# Patient Record
Sex: MALE | Race: WHITE | NOT HISPANIC OR LATINO | URBAN - METROPOLITAN AREA
[De-identification: names, ages, dates, MRNs, and addresses within clinical notes are randomized per-mention and may not be internally consistent; named-entity substitution may affect disease eponyms.]

---

## 2024-02-09 ENCOUNTER — PATIENT ROUNDING (BHMG ONLY) (OUTPATIENT)
Dept: ORTHOPEDIC SURGERY | Facility: CLINIC | Age: 59
End: 2024-02-09
Payer: COMMERCIAL

## 2024-02-09 ENCOUNTER — OFFICE VISIT (OUTPATIENT)
Dept: ORTHOPEDIC SURGERY | Facility: CLINIC | Age: 59
End: 2024-02-09
Payer: COMMERCIAL

## 2024-02-09 VITALS
BODY MASS INDEX: 21.31 KG/M2 | HEIGHT: 75 IN | SYSTOLIC BLOOD PRESSURE: 138 MMHG | WEIGHT: 171.4 LBS | DIASTOLIC BLOOD PRESSURE: 93 MMHG | HEART RATE: 77 BPM

## 2024-02-09 DIAGNOSIS — M16.11 PRIMARY OSTEOARTHRITIS OF RIGHT HIP: ICD-10-CM

## 2024-02-09 DIAGNOSIS — M16.12 PRIMARY OSTEOARTHRITIS OF LEFT HIP: ICD-10-CM

## 2024-02-09 DIAGNOSIS — M25.552 LEFT HIP PAIN: Primary | ICD-10-CM

## 2024-02-09 RX ORDER — MELOXICAM 7.5 MG/1
15 TABLET ORAL ONCE
OUTPATIENT
Start: 2024-02-09 | End: 2024-02-09

## 2024-02-09 RX ORDER — PREGABALIN 150 MG/1
150 CAPSULE ORAL ONCE
OUTPATIENT
Start: 2024-02-09 | End: 2024-02-09

## 2024-02-09 RX ORDER — CHLORHEXIDINE GLUCONATE 500 MG/1
CLOTH TOPICAL ONCE
OUTPATIENT
Start: 2024-02-09

## 2024-02-09 RX ORDER — MELOXICAM 15 MG/1
15 TABLET ORAL DAILY
Qty: 30 TABLET | Refills: 2 | Status: SHIPPED | OUTPATIENT
Start: 2024-02-09

## 2024-02-09 RX ORDER — DICLOFENAC SODIUM 75 MG/1
1 TABLET, DELAYED RELEASE ORAL EVERY 12 HOURS SCHEDULED
COMMUNITY
Start: 2024-02-01 | End: 2024-02-09

## 2024-02-09 NOTE — PROGRESS NOTES
A card has been sent to the patient for PATIENT ROUNDING with [facility].

## 2024-02-09 NOTE — PROGRESS NOTES
"Subjective:     Patient ID: Ernst Clark is a 58 y.o. male.    Chief Complaint:    History of Present Illness  Ernst Clark presents to clinic today for evaluation of bilateral right greater than left hip pain.  The patient states that that hips for years needs noted but he is put up with it and continued working.  He states that is the point that he can order across his legs is significantly affecting his quality life and his ability to work and provide for his family.  He has a good deal of pain and stiffness and significantly decreased range of motion.  He states pain is located anteriorly in the groin bilaterally and he can feel grinding and popping when he ambulates.     Social History     Occupational History    Not on file   Tobacco Use    Smoking status: Some Days     Types: Cigarettes     Passive exposure: Current    Smokeless tobacco: Never    Tobacco comments:     quit smoking 30 years ago   Vaping Use    Vaping Use: Never used   Substance and Sexual Activity    Alcohol use: Not Currently    Drug use: Never    Sexual activity: Defer      History reviewed. No pertinent past medical history.  Past Surgical History:   Procedure Laterality Date    KNEE CARTILAGE SURGERY Right     pt states over 20 years ago       History reviewed. No pertinent family history.              Objective:  Vitals:    02/09/24 1002   BP: 138/93   Pulse: 77   Weight: 77.7 kg (171 lb 6.4 oz)   Height: 190.5 cm (75\")         02/09/24  1002   Weight: 77.7 kg (171 lb 6.4 oz)     Body mass index is 21.42 kg/m².        Right Hip Exam     Tenderness   The patient is experiencing tenderness in the anterior.    Range of Motion   Flexion:  90   External rotation:  10   Internal rotation:  0     Muscle Strength   Flexion: 4/5     Tests   JENNI: positive  Fadir:  Positive FADIR test    Other   Erythema: absent  Scars: absent  Sensation: normal  Pulse: present      Left Hip Exam     Tenderness   The patient is experiencing tenderness in " the anterior.    Range of Motion   Flexion:  100   External rotation:  20   Internal rotation: 0     Muscle Strength   Flexion: 4/5     Tests   JENNI: positive  Fadir:  Positive FADIR test    Other   Erythema: absent  Scars: absent  Sensation: normal  Pulse: present               Imaging: 3 views of the bilateral hip and pelvis were ordered and reviewed by myself in the office today  Indication: bilateral hip pain  Findings: X-rays demonstrate no acute osseous abnormality.  There is no signs of fracture dislocation or subluxation.  There is joint space narrowing, subchondral sclerosis, cystic changes, and periarticular osteophytes.  Right hip shows complete loss of joint space with collapse of the femoral head and large cystic changes.  Comparative studies: None      Assessment:        1. Left hip pain    2. Primary osteoarthritis of right hip    3. Primary osteoarthritis of left hip           Plan:        He has failed conservative management of bilateral hip osteoarthritis including activity modification, NSAIDs, rest, and observation.  We discussed further conservative treatments including but not limited to physical therapy, intra-articular injections, nonsteroidal anti-inflammatories, topical creams, use of an assistive device, weight loss, and low impact exercises.  Hevoiced understanding of further nonoperative treatment options but He is interested in proceeding with hip replacement surgery.    The spectrum of treatment options were discussed with the patient in detail including both the nonoperative and operative treatment modalities and their respective risks and benefits.  After thorough discussion, the patient has elected to undergo surgical treatment.  The details of the surgical procedure were explained including the location of probable incisions and a description of the likely implants to be used.  Models and diagrams were used as educational resources. The patient understands the likely  convalescence after surgery, as well as the rehabilitation required.  We thoroughly discussed the risks, benefits, and alternatives to surgery.  The risks include but are not limited to the risk of infection, joint stiffness, blood clots (including DVT and/or pulmonary embolus along with the risk of death), neurologic and/or vascular injury, fracture, dislocation, nonunion, malunion, need for further surgery including hardware failure requiring revision, and continued pain.  It was explained that if tissue has been repaired or reconstructed, there is also a chance of failure which may require further management.  Following the completion of the discussion, the patient expressed understanding of this planned course of care, all their questions were answered and consent will be obtained preoperatively.    Plan for right total hip arthroplasty, anterior approach.    Implants: Depuy Actis Press Fit   Anticoagulation: Asprin  Antibiotics: Cefazolin  Admission Type: Same Day  TXA: Yes          Ernst Clark was in agreement with plan and had all questions answered.     Medications:  New Medications Ordered This Visit   Medications    meloxicam (MOBIC) 15 MG tablet     Sig: Take 1 tablet by mouth Daily.     Dispense:  30 tablet     Refill:  2       Followup:  No follow-ups on file.    Diagnoses and all orders for this visit:    1. Left hip pain (Primary)  -     XR Hip With or Without Pelvis 2 - 3 View Left    2. Primary osteoarthritis of right hip    3. Primary osteoarthritis of left hip    Other orders  -     meloxicam (MOBIC) 15 MG tablet; Take 1 tablet by mouth Daily.  Dispense: 30 tablet; Refill: 2          Dictated utilizing Dragon dictation

## 2024-02-21 ENCOUNTER — TELEPHONE (OUTPATIENT)
Dept: ORTHOPEDIC SURGERY | Facility: CLINIC | Age: 59
End: 2024-02-21
Payer: COMMERCIAL

## 2024-02-21 ENCOUNTER — PREP FOR SURGERY (OUTPATIENT)
Dept: OTHER | Facility: HOSPITAL | Age: 59
End: 2024-02-21
Payer: COMMERCIAL

## 2024-02-21 DIAGNOSIS — M16.11 PRIMARY OSTEOARTHRITIS OF RIGHT HIP: Primary | ICD-10-CM

## 2024-02-21 RX ORDER — PREDNISONE 10 MG/1
TABLET ORAL
Qty: 39 TABLET | Refills: 0 | Status: SHIPPED | OUTPATIENT
Start: 2024-02-21

## 2024-02-21 NOTE — TELEPHONE ENCOUNTER
Patient is asking if he could get rx for prednisone sent in for his hip pain.  This was last filled by another provider on 11/16/23.  He is currently scheduled for total hip on 4/29/24.

## 2024-11-22 ENCOUNTER — OFFICE VISIT (OUTPATIENT)
Dept: ORTHOPEDIC SURGERY | Facility: CLINIC | Age: 59
End: 2024-11-22
Payer: COMMERCIAL

## 2024-11-22 VITALS — BODY MASS INDEX: 22.38 KG/M2 | HEIGHT: 75 IN | WEIGHT: 180 LBS

## 2024-11-22 DIAGNOSIS — M16.11 PRIMARY OSTEOARTHRITIS OF RIGHT HIP: Primary | ICD-10-CM

## 2024-11-22 DIAGNOSIS — M16.12 PRIMARY OSTEOARTHRITIS OF LEFT HIP: ICD-10-CM

## 2024-11-22 RX ORDER — GABAPENTIN 300 MG/1
CAPSULE ORAL
COMMUNITY
Start: 2024-11-01

## 2024-11-22 RX ORDER — AMLODIPINE BESYLATE 10 MG/1
10 TABLET ORAL DAILY
COMMUNITY
Start: 2024-10-22

## 2024-11-22 RX ORDER — ROSUVASTATIN CALCIUM 40 MG/1
1 TABLET, COATED ORAL DAILY
COMMUNITY
Start: 2024-10-15

## 2024-11-22 RX ORDER — OXYCODONE AND ACETAMINOPHEN 5; 325 MG/1; MG/1
TABLET ORAL
COMMUNITY
Start: 2024-11-21

## 2024-11-22 RX ORDER — PREGABALIN 150 MG/1
150 CAPSULE ORAL ONCE
OUTPATIENT
Start: 2024-11-22 | End: 2024-11-22

## 2024-11-22 RX ORDER — CHLORHEXIDINE GLUCONATE 500 MG/1
CLOTH TOPICAL ONCE
OUTPATIENT
Start: 2024-11-22 | End: 2024-11-22

## 2024-11-22 RX ORDER — LOSARTAN POTASSIUM AND HYDROCHLOROTHIAZIDE 12.5; 5 MG/1; MG/1
1 TABLET ORAL DAILY
COMMUNITY
Start: 2024-10-15

## 2024-11-22 NOTE — PROGRESS NOTES
"Subjective:     Patient ID: Ernst Clark is a 59 y.o. male.    Chief Complaint:    History of Present Illness  Ernst Clark returns to clinic today for evaluation of left greater than right hip pain.  The patient states that the hip pain is now so severe that he can barely ambulate and his hips are catching locking clicking and popping.  He states that the pain is excruciating he is not able to sleep and narcotics are no longer controlling the pain.  The patient can barely walk today.  He has audible clicking and grinding when walking down the hallway.     Social History     Occupational History    Not on file   Tobacco Use    Smoking status: Some Days     Types: Cigarettes     Passive exposure: Current    Smokeless tobacco: Never    Tobacco comments:     quit smoking 30 years ago   Vaping Use    Vaping status: Never Used   Substance and Sexual Activity    Alcohol use: Not Currently    Drug use: Never    Sexual activity: Defer      Past Medical History:   Diagnosis Date    Arthritis     Hip pain, bilateral      Past Surgical History:   Procedure Laterality Date    KNEE CARTILAGE SURGERY Right     pt states over 20 years ago       History reviewed. No pertinent family history.              Objective:  Vitals:    11/22/24 1407   Weight: 81.6 kg (180 lb)   Height: 190.5 cm (75\")         11/22/24  1407   Weight: 81.6 kg (180 lb)     Body mass index is 22.5 kg/m².  BMI is within normal parameters. No other follow-up for BMI required.        Right Hip Exam     Tenderness   The patient is experiencing tenderness in the anterior and lateral.    Range of Motion   Flexion:  abnormal   External rotation:  abnormal   Internal rotation:  abnormal     Muscle Strength   Flexion: 3/5     Tests   JENNI: positive  Fadir:  Positive FADIR test    Other   Erythema: absent  Scars: absent  Sensation: normal  Pulse: present      Left Hip Exam     Tenderness   The patient is experiencing tenderness in the anterior and " lateral.    Range of Motion   Flexion:  abnormal   External rotation:  abnormal   Internal rotation: abnormal     Muscle Strength   Flexion: 3/5     Tests   JENNI: positive  Fadir:  Positive FADIR test    Other   Erythema: absent  Scars: absent  Sensation: normal  Pulse: present               Imaging:     Imaging: 3 views of the bilateral hip and pelvis were ordered and reviewed by myself in the office today  Indication: bilateral hip pain  Findings: X-rays demonstrate no acute osseous abnormality.  There is no signs of fracture dislocation or subluxation.  There is loss of joint space narrowing, subchondral sclerosis, cystic changes, and periarticular osteophytes.  There is femoral head flattening and what appears to be JERARDO of the left hip abductor  Comparative studies: None      Assessment:        1. Primary osteoarthritis of right hip    2. Primary osteoarthritis of left hip           Plan:          He has failed conservative management of left hip osteoarthritis.  We discussed further conservative treatments including but not limited to physical therapy, intra-articular injections, nonsteroidal anti-inflammatories, topical creams, use of an assistive device, weight loss, and low impact exercises.  Hevoiced understanding of further nonoperative treatment options but He is interested in proceeding with hip replacement surgery.    The spectrum of treatment options were discussed with the patient in detail including both the nonoperative and operative treatment modalities and their respective risks and benefits.  After thorough discussion, the patient has elected to undergo surgical treatment.  The details of the surgical procedure were explained including the location of probable incisions and a description of the likely implants to be used.  Models and diagrams were used as educational resources. The patient understands the likely convalescence after surgery, as well as the rehabilitation required.  We thoroughly  discussed the risks, benefits, and alternatives to surgery.  The risks include but are not limited to the risk of infection, joint stiffness, blood clots (including DVT and/or pulmonary embolus along with the risk of death), neurologic and/or vascular injury, fracture, dislocation, nonunion, malunion, need for further surgery including hardware failure requiring revision, and continued pain.  It was explained that if tissue has been repaired or reconstructed, there is also a chance of failure which may require further management.  Following the completion of the discussion, the patient expressed understanding of this planned course of care, all their questions were answered and consent will be obtained preoperatively.    Plan for left total hip arthroplasty, anterior approach.    Implants: Perfect Channel and nephew catalyst Press Fit   Anticoagulation: Asprin  Antibiotics: Cefazolin  Admission Type: Same Day  TXA: Yes    CT scan of the pelvis is warranted preoperatively given significant bony deformity and heterotopic ossification of the left hip for preoperative planning this was ordered.  Today.      Ernst Clark was in agreement with plan and had all questions answered.     Medications:  No orders of the defined types were placed in this encounter.      Followup:  No follow-ups on file.    Diagnoses and all orders for this visit:    1. Primary osteoarthritis of right hip (Primary)  -     XR Hips Bilateral With or Without Pelvis 2 View  -     CT Pelvis Without Contrast; Future    2. Primary osteoarthritis of left hip  -     CT Pelvis Without Contrast; Future  -     Case Request; Standing  -     CBC and Differential; Future  -     Basic metabolic panel; Future  -     MRSA Screen Culture (Outpatient) - Swab, Nares; Future  -     Hemoglobin A1c; Future  -     Type and screen; Future  -     lactated ringers bolus 500 mL  -     Tranexamic Acid 1,000 mg in sodium chloride 0.9 % 100 mL  -     Tranexamic Acid 1,000 mg in  sodium chloride 0.9 % 100 mL  -     Chlorhexidine Gluconate Cloth 2 % pads  -     ethyl alcohol 62 % 2 each  -     ceFAZolin (ANCEF) 2 g in sodium chloride 0.9 % 100 mL IVPB  -     pregabalin (LYRICA) capsule 150 mg  -     Case Request    Other orders  -     Follow Anesthesia Guidelines / Protocol; Future  -     Follow Anesthesia Guidelines / Protocol; Standing  -     Verify NPO Status; Standing  -     SCD (sequential compression device)- to be placed on patient in Pre-op; Standing  -     Clip operative site; Standing  -     Type & Screen; Standing          Dictated utilizing Dragon dictation

## 2024-12-06 ENCOUNTER — HOSPITAL ENCOUNTER (OUTPATIENT)
Dept: CT IMAGING | Facility: HOSPITAL | Age: 59
Discharge: HOME | End: 2024-12-06
Payer: COMMERCIAL

## 2024-12-06 DIAGNOSIS — M16.12 PRIMARY OSTEOARTHRITIS OF LEFT HIP: ICD-10-CM

## 2024-12-06 DIAGNOSIS — M16.11 PRIMARY OSTEOARTHRITIS OF RIGHT HIP: ICD-10-CM

## 2024-12-06 PROCEDURE — 72192 CT PELVIS W/O DYE: CPT

## 2024-12-10 ENCOUNTER — TELEPHONE (OUTPATIENT)
Dept: ORTHOPEDIC SURGERY | Facility: CLINIC | Age: 59
End: 2024-12-10
Payer: COMMERCIAL

## 2024-12-10 NOTE — TELEPHONE ENCOUNTER
Called patient to go over results of CT scan.  Nothing alarming noted.  Will plan for total hip arthroplasty left on 1/22/2025

## 2024-12-10 NOTE — TELEPHONE ENCOUNTER
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     Comparison: None available.     Technique: Axial CT images were obtained of the pelvis without contrast administration.  Sagittal and coronal reconstructions were performed.  Automated exposure control and iterative reconstruction methods were used.        Findings:  No displaced fracture or malalignment is observed throughout the osseous pelvis or involving the bilateral hips. The hips are intact. The SI joints and pubic symphysis are intact. The sacral alae and pubic rami are intact. The sacrum and coccyx are   intact. There is no malalignment of the coccyx. The sacral neuroforamina are patent. Age-related changes/degenerative changes are noted. There is severe osteoarthritis of the bilateral hips. These changes may be related to underlying AVN of the hips.     The myotendinous attachments associated with the hips and pelvis bilaterally are intact without avulsion or retraction. There is chronic calcification of the gluteal myotendinous attachments along the left iliac bone suggesting changes of tendinopathy   and/or prior partial injury. There is no edema or abnormal fluid collection in the surrounding soft tissues. There is no hemorrhage or hematoma. No acute abnormalities are noted involving the intraperitoneal soft tissues of the pelvis.     IMPRESSION:  Impression:  1.No evidence for displaced fracture or malalignment throughout the osseous pelvis or involving the bilateral hips.  2.Severe osteoarthritis of the hips bilaterally. These findings may be related changes of prior underlying AVN.  3.No evidence for acute soft tissue abnormality. There is no evidence for musculotendinous avulsion or retraction.           Electronically Signed: Lam Ribeiro MD    12/6/2024 3:48 PM EST    Workstation ID: [id number]

## 2025-01-08 ENCOUNTER — TELEPHONE (OUTPATIENT)
Dept: ORTHOPEDIC SURGERY | Facility: CLINIC | Age: 60
End: 2025-01-08
Payer: COMMERCIAL

## 2025-01-08 NOTE — TELEPHONE ENCOUNTER
Spoke with Karin from [facility]. Informed her that patient has a Pat appointment with [facility] on 1-9-25 for his surgery. I will fax over all labs and EKG after we receive them, for his follow up appointment with their office next week. I did verify their fax number.

## 2025-01-08 NOTE — TELEPHONE ENCOUNTER
Caller: GLORIA FROM [facility]    Relationship: HOSPITAL    Best call back number: [phone]    What orders are you requesting (i.e. lab or imaging):  PAT ORDERS FOR LEFT HIP SURGERY ON 1/22/25    In what timeframe would the patient need to come in:  SAYS PATIENT MADE HIS OWN APPT. TODAY WITH THEM FOR PAT'S. THEY HAVE OFFICE NOTE OF OURS FROM 11/22/24 BUT NO ORDERS. PATIENT WAS THERE BUT LEFT WHEN HOSPITAL COULD NOT REACH THE CLINICAL LINE.    Where will you receive your lab/imaging services:  FAX # [phone]    Additional notes:   HUB NOTICED THAT PATIENT IS SCHEDULED FOR PAT'S ON 1/9/25 AT [facility].

## 2025-01-09 ENCOUNTER — PRE-ADMISSION TESTING (OUTPATIENT)
Dept: PREADMISSION TESTING | Facility: HOSPITAL | Age: 60
End: 2025-01-09
Payer: COMMERCIAL

## 2025-01-09 ENCOUNTER — HOSPITAL ENCOUNTER (OUTPATIENT)
Dept: PHYSICAL THERAPY | Facility: HOSPITAL | Age: 60
Setting detail: THERAPIES SERIES
Discharge: HOME | End: 2025-01-09
Payer: COMMERCIAL

## 2025-01-09 VITALS
OXYGEN SATURATION: 95 % | DIASTOLIC BLOOD PRESSURE: 82 MMHG | BODY MASS INDEX: 21.46 KG/M2 | RESPIRATION RATE: 16 BRPM | SYSTOLIC BLOOD PRESSURE: 114 MMHG | HEIGHT: 75 IN | WEIGHT: 172.6 LBS | HEART RATE: 70 BPM

## 2025-01-09 DIAGNOSIS — M16.11 PRIMARY OSTEOARTHRITIS OF RIGHT HIP: ICD-10-CM

## 2025-01-09 DIAGNOSIS — M16.12 PRIMARY OSTEOARTHRITIS OF LEFT HIP: ICD-10-CM

## 2025-01-09 LAB
ABO GROUP BLD: NORMAL
ABO GROUP BLD: NORMAL
ANION GAP SERPL CALCULATED.3IONS-SCNC: 12.2 MMOL/L (ref 5–15)
BASOPHILS # BLD AUTO: 0.01 10*3/MM3 (ref 0–0.2)
BASOPHILS NFR BLD AUTO: 0.2 % (ref 0–1.5)
BLD GP AB SCN SERPL QL: NEGATIVE
BUN SERPL-MCNC: 15 MG/DL (ref 8–23)
BUN/CREAT SERPL: 16.3 (ref 7–25)
CALCIUM SPEC-SCNC: 9.4 MG/DL (ref 8.6–10.5)
CHLORIDE SERPL-SCNC: 102 MMOL/L (ref 98–107)
CO2 SERPL-SCNC: 24.8 MMOL/L (ref 22–29)
CREAT SERPL-MCNC: 0.92 MG/DL (ref 0.76–1.27)
DEPRECATED RDW RBC AUTO: 44.3 FL (ref 37–54)
EGFRCR SERPLBLD CKD-EPI 2021: 95.2 ML/MIN/1.73
EOSINOPHIL # BLD AUTO: 0.17 10*3/MM3 (ref 0–0.4)
EOSINOPHIL NFR BLD AUTO: 3.3 % (ref 0.3–6.2)
ERYTHROCYTE [DISTWIDTH] IN BLOOD BY AUTOMATED COUNT: 12.5 % (ref 12.3–15.4)
GLUCOSE SERPL-MCNC: 111 MG/DL (ref 65–99)
HBA1C MFR BLD: 6.22 % (ref 4.8–5.6)
HCT VFR BLD AUTO: 44.1 % (ref 37.5–51)
HGB BLD-MCNC: 14.5 G/DL (ref 13–17.7)
IMM GRANULOCYTES # BLD AUTO: 0.01 10*3/MM3 (ref 0–0.05)
IMM GRANULOCYTES NFR BLD AUTO: 0.2 % (ref 0–0.5)
LYMPHOCYTES # BLD AUTO: 1.68 10*3/MM3 (ref 0.7–3.1)
LYMPHOCYTES NFR BLD AUTO: 32.4 % (ref 19.6–45.3)
MCH RBC QN AUTO: 31.1 PG (ref 26.6–33)
MCHC RBC AUTO-ENTMCNC: 32.9 G/DL (ref 31.5–35.7)
MCV RBC AUTO: 94.6 FL (ref 79–97)
MONOCYTES # BLD AUTO: 0.45 10*3/MM3 (ref 0.1–0.9)
MONOCYTES NFR BLD AUTO: 8.7 % (ref 5–12)
NEUTROPHILS NFR BLD AUTO: 2.86 10*3/MM3 (ref 1.7–7)
NEUTROPHILS NFR BLD AUTO: 55.2 % (ref 42.7–76)
PLATELET # BLD AUTO: 178 10*3/MM3 (ref 140–450)
PMV BLD AUTO: 9.9 FL (ref 6–12)
POTASSIUM SERPL-SCNC: 4.1 MMOL/L (ref 3.5–5.2)
QT INTERVAL: 415 MS
QTC INTERVAL: 442 MS
RBC # BLD AUTO: 4.66 10*6/MM3 (ref 4.14–5.8)
RH BLD: POSITIVE
RH BLD: POSITIVE
SODIUM SERPL-SCNC: 139 MMOL/L (ref 136–145)
T&S EXPIRATION DATE: NORMAL
WBC NRBC COR # BLD AUTO: 5.18 10*3/MM3 (ref 3.4–10.8)

## 2025-01-09 PROCEDURE — 86901 BLOOD TYPING SEROLOGIC RH(D): CPT | Performed by: INTERNAL MEDICINE

## 2025-01-09 PROCEDURE — 86900 BLOOD TYPING SEROLOGIC ABO: CPT | Performed by: INTERNAL MEDICINE

## 2025-01-09 PROCEDURE — 86900 BLOOD TYPING SEROLOGIC ABO: CPT

## 2025-01-09 PROCEDURE — 83036 HEMOGLOBIN GLYCOSYLATED A1C: CPT | Performed by: INTERNAL MEDICINE

## 2025-01-09 PROCEDURE — 36415 COLL VENOUS BLD VENIPUNCTURE: CPT

## 2025-01-09 PROCEDURE — 93005 ELECTROCARDIOGRAM TRACING: CPT

## 2025-01-09 PROCEDURE — 93010 ELECTROCARDIOGRAM REPORT: CPT | Performed by: INTERNAL MEDICINE

## 2025-01-09 PROCEDURE — 86850 RBC ANTIBODY SCREEN: CPT | Performed by: INTERNAL MEDICINE

## 2025-01-09 PROCEDURE — 86901 BLOOD TYPING SEROLOGIC RH(D): CPT

## 2025-01-09 PROCEDURE — 80048 BASIC METABOLIC PNL TOTAL CA: CPT | Performed by: INTERNAL MEDICINE

## 2025-01-09 PROCEDURE — 85025 COMPLETE CBC W/AUTO DIFF WBC: CPT | Performed by: INTERNAL MEDICINE

## 2025-01-09 PROCEDURE — 87081 CULTURE SCREEN ONLY: CPT | Performed by: INTERNAL MEDICINE

## 2025-01-09 RX ORDER — LOSARTAN POTASSIUM AND HYDROCHLOROTHIAZIDE 25; 100 MG/1; MG/1
1 TABLET ORAL DAILY
COMMUNITY

## 2025-01-09 NOTE — DISCHARGE INSTRUCTIONS
PRE-ADMISSION TESTING INSTRUCTIONS FOR TOTAL JOINT PATIENTS    Take these medications the morning of surgery with a small sip of water:  amlodipine and pain pill if needed      No aspirin, advil, aleve, ibuprofen, naproxen, diet pills, decongestants, or vitamin/herbal supplements for a week prior to your surgery.     Tylenol/Acetaminophen ok to take if needed.    Do not take any insulin or diabetes medications the morning of surgery.    Your surgeon may give you Bactroban to use prior to surgery. This will be started 5 days prior to surgery, follow the directions on your prescription from your surgeon for use.      General Instructions:    DO NOT EAT SOLID FOOD AFTER MIDNIGHT THE NIGHT BEFORE SURGERY. No gum, mints, or hard candy after midnight the night before surgery.  You may drink clear liquids the day of surgery up until 2 hours before your arrival time.  Clear liquids are liquids you can see through. Nothing RED in color.    Plain water    Sports drinks      Gelatin (Jell-O)  Fruit juices without pulp such as white grape juice and apple juice  Popsicles that contain no fruit or yogurt  Tea or coffee (no cream or milk added)    It is beneficial for you to have a clear drink that contains carbohydrates 2 hours prior to your arrival time.  DRINK A BOTTLE OF SPORTS DRINK 2 HOURS BEFORE YOUR ARRIVAL TIME. IF YOU ARE DIABETIC, DRINK A LOW OR NO SUGAR SPORTS DRINK. ANY COLOR EXCEPT RED.    Patients who avoid smoking, chewing tobacco and alcohol for 4 weeks prior to surgery have a reduced risk of post-operative complications.  If at all possible, quit smoking as many days before surgery as you can.    Do not smoke, use chewing tobacco or drink alcohol after midnight the day of surgery.    Bring your C-PAP/ BI-PAP machine if you use one.  Wear clean comfortable clothes.  Do not wear contact lenses, lotion, deodorant, or make-up.  Bring a case for your glasses if applicable.   You may brush your teeth the morning of  surgery.  You may wear dentures/partials, do not put adhesive/glue on them.  Leave all other jewelry and valuables at home.  NOTIFY YOUR SURGEON IF YOU BECOME ILL, HAVE A FEVER, PRODUCTIVE COUGH, OR CANNOT BE HERE THE DAY OF SURGERY.      Preventing a Surgical Site Infection:    Shower the night before and on the morning of surgery using the chlorhexidine soap you were given.  Use a clean washcloth with the soap.  Place clean sheets on your bed after showering the night before surgery. Do not use the CHG soap on your hair, face, or private areas. Wash your body gently for five (5) minutes. Do not scrub your skin.  Dry with a clean towel and dress in clean clothing.    Do not shave the surgical area for 10 days-2 weeks prior to surgery  because the razor can irritate skin and make it easier to develop an infection.  Make sure you, your family, and all healthcare providers clean their hands with soap and water or an alcohol based hand  before caring for you or your wound.      Day of surgery:    Your surgeon’s office will advise you of your arrival time for the day of surgery.    Upon arrival, a Pre-op nurse and Anesthesia provider will review your health history, obtain vital signs, and answer questions you may have. The anesthesia provider will also discuss the type of anesthesia that will be needed for your procedure, which may include general anesthesia. The only belongings needed at this time will be your home medications and if applicable your C-PAP/BI-PAP machine.  If you are staying overnight your family can leave the rest of your belongings in the car and bring them to your room later.  A Pre-op nurse will start an IV and you may receive medication in preparation for surgery, including something to help you relax.  Your family will be able to see you in the Pre-op area.  While you are in surgery your family should notify the waiting room  if they leave the waiting room area and provide a  contact phone number.    If you have any questions, you can call the Pre-Admission Department at [phone] or your surgeon's office.    Please be aware that surgery does come with discomfort.  We want to make every effort to control your discomfort so please discuss any uncontrolled symptoms with your nurse.   Your doctor will most likely have prescribed pain medications.     You may have bruising or discomfort from the tourniquet used in surgery.     Please leave all luggage in the car the morning of surgery.  You will be transported to your hospital room following the recovery period.  Your family can get your luggage at that time.      You may receive a survey regarding the care you received. Your feedback is very important and will be used to collect the necessary data to help us to continue to provide excellent care.

## 2025-01-09 NOTE — PAT
Pt here for PAT visit.  Pre-op tests completed, chg soap given, and instructions reviewed.  Instructed clears until 2 hrs prior to arrival time, voiced understanding. HARIKA dressing handout given and reviewed, voiced understanding. Pt unsure of  day of surgery and who is staying w/him after discharge; instructed had to have someone over age 18 with him; voiced understanding.

## 2025-01-10 ENCOUNTER — ANESTHESIA EVENT (OUTPATIENT)
Dept: PERIOP | Facility: HOSPITAL | Age: 60
End: 2025-01-10
Payer: COMMERCIAL

## 2025-01-11 LAB — MRSA SPEC QL CULT: ABNORMAL

## 2025-01-21 NOTE — OP NOTE
OPERATIVE REPORT    Date of Surgery:   01/22/25    Attending Surgeon:   Gilberto Rowell MD, MSE    ASSISTANTS:    Assistant: (Noah Mariano CSA) was responsible for performing the following activities: Retraction, Irrigation, Suturing, Closing, and Placing Dressing and their skilled assistance was necessary for the success of this case.     ANESTHESIA:   Spinal    PREOPERATIVE DIAGNOSIS:   Left hip osteoarthritis    POSTOPERATIVE DIAGNOSIS:   Same as above     PROCEDURE:   1. Left primary total hip arthroplasty    SURGICAL APPROACH:        IMPLANTS:   1.Covarrubias and Nephew R3 Acetabular Shell, 60 mm OD  2. Smith and Nephew 6.5 mm acetabular screws, 2 30 and 40 mm  3. Covarrubias and Nephew Dual Mobility  46  mm ID, neutral  4. Smith and Nephew Polar standard offset, size 4  5. Femoral Head, Size 28, Neck length 4  6. Dual mobility outer ball 46 mm OD, 28 mm ID    CLOSURE:  #5 Ethibond  #1 PDS STRATAFIX  2-0 Monocryl STRATAFIX  3-0 Monocryl STRATAFIX  Exofin glue    INDICATIONS:   This patient has a history of Left end stage hip osteoarthritis. The patient has failed conservative treatment. He is therefore indicated for total hip arthroplasty.  Risks of surgery, including pain, bleeding, infection, scar, injury to nearby neurovascular structures, fracture, venous thromboembolism, limb length discrepancy, dislocation, need for more procedures, implant failure, implant loosening, and implant subsidence were discussed preoperatively.  The risks of anesthesia including heart attack, stroke, blood clots and even death were discussed. The benefits of surgery including pain relief and restoration of function were discussed. Alternatives to surgery, including nonoperative care were also discussed and the patient elects to proceed with CHERYL.    OPERATIVE FINDINGS:  Left full thickness cartilage loss on the femoral head and acetabulum and osteophytes    DESCRIPTION OF PROCEDURE:   The patient was met in the preoperative holding area,  evaluated, consent was obtained, and the Left hip was marked.  The patient was brought to the operating room and placed on the operating room table in the supine position.  After anesthesia was established, the patient was prepped and draped in the usual sterile fashion. All bony prominences and peripheral nerves were padded.  A surgical time out was taken to confirm the operative side and details of the operative procedure.  The patient was given prophylactic antibiotics prior to skin incision.      We utilized an anterior oblique incision and the Covarrubias-Leslie interval to access the hip. An oblique anterior incision was made starting 2 centimeters lateral and distal to the anterior-superior iliac spine and extending distal and slight lateral in line with the tensor fascia adrian.  The position of the incision was confirmed with fluoroscopy prior to incision. This incision was carried through subcutaneous tissue to the underlying fascia of the tensor fascia adrian muscle.  The fascia was incised.  The tensor fascia muscle belly was reflected superolaterally with a curved cobra retractor over the femoral neck.  The rectus was reflected medially with a curved Cobra retractor, inferior to the femoral neck.  A third retractor was carefully placed under the rectus onto the anterior hip capsule directly on bone.    The anterior hip capsule was exposed.  The reflected head of the rectus tendon was exposed.  The capsulotomy was performed along the axis of the superior femoral neck.  A superior flap was raised off the greater trochanter and off the acetabular rim and reflected superiorly underneath our curved Cobra retractor.  An inferior flap was created by peeling the capsule off the intertrochanteric ridge inferomedially to the top of the lesser trochanter, and again reflected anteriorly with our curved Cobra retractor.  The capsular flaps were tagged. The femoral neck cut was then made.  This was based off our preoperative  plan using anatomic landmarks and confirmed with fluoroscopic guidance.  The femoral neck cut was made in two places.  The central segment of the neck was removed separately, and then the femoral head was removed.      The acetabulum was exposed.  A bent Hohmann retractor was placed anterior-inferior between the capsule and the labrum.  A Cobra retractor was placed posteriorly between the capsule and the labrum. The inferior capsule was incised. The acetabular labrum was excised.  Overhanging soft tissue was excised to provide excellent exposure of the acetabulum. The acetabulum was then sequentially reamed under direct visualization and fluoroscopic guidance with increased size reamers until our final reamer, which gave a 1 millimeter press-fit.  The acetabulum was then placed and impacted into position.  The acetabular component placement goal was approximately 42.5 degrees of abduction, with 15 degrees of anteversion.  We used our preoperative planning, bony landmarks and fluoroscopy to assess position.  The acetabular component was impacted into the pelvis and then secured to the pelvis with 2 screws.  The acetabular liner was placed, impacted into position; it locked.  The wound was then irrigated.     We then turned to the proximal femur.  The retractors were removed, and a new set of retractors was placed.  The OmniTract femoral elevator hook was placed around the vastus lateralis and proximal femur between the vastus tubercle and gluteus jessenia tendon. The hip was hyperextended by putting the bed in Trendelenberg and lowering the foot of the bed. The hook was assembled to its mounting frame and a gentle amount of tension was placed on the hook.    The double pronged retractor was placed on the posteromedial proximal femur and the double toothed retractor was placed in the interval between the capsule and gluteus minimus. Femoral releases of the capsule, piriformis and conjoint tendon were sequentially  performed as needed until adequate exposure was obtained.  The femur was then extended, adducted, and externally rotated.  The proximal femur was mobilized into the wound to create adequate exposure of the proximal femur.  After exposure was obtained, the femur was prepared with a box osteotome, curved awl, and then broached up to the final broach size. The hook was de-tensioned and the hook and retractors were then removed.    AP and lateral fluoroscopy of the femur were obtained to confirm good fit and alignment of the stem, as well as no fractures or cortical perforations were present. The trial neck and head were then placed, and the hip was reduced. Fluoroscopy was used to confirm length and offset were correct. Leg lengths were assessed clinically on the table.  The hip was assessed in extension, external rotation to check anterior stability.  It was stable.  Posterior stability was assessed with flexion, adduction, internal rotation. The hip was stable.  We flexed to 90 with internal rotation, stable.  Full flexion stable.  The hip was then dislocated.  The femur was exposed with the hook and retractors. The trial implants were removed.  The final hip implants were placed, impacted into position.  The stem had a good fit and fixation.  The femoral head was placed.  The hip was reduced.  With the final implants in position, we trialed the hip again.  Fluoroscopic images were again obtained, including AP and lateral views, which demonstrated excellent component position and no intraoperative fractures. The leg lengths, anterior stability, and posterior stability were all excellent.  The hip was then copiously irrigated with dilute Betadine and normal saline using a pulsatile lavage.  The hip capsular flaps were closed with a #5 Ethibond. The tensor fascia was closed with #1 PDS STRATAFIX.  The hip capsular flaps as well as fascia and subcutaneous tissue were injected with a mixture consisting of 30 mL of 0.5%  ropivacaine, 30 mg of ketorolac, and epinephrine 0.2 mg solution diluted in 30 mL of normal saline.  Subcutaneous tissue closed with 2-0 Monocryl STRATAFIX.  The skin was closed with 3-0 Monocryl, STRATAFIX, and exofin glue and a sterile HARIKA dressing was placed.    The patient tolerated the procedure well and was taken to the recovery room in stable condition.  Following completion of the surgery all sponge instrument needle counts were correc    POSTOPERATIVE PLAN:   1. Weightbearing as tolerated on operative extremity  2. Anterior hip precautions x 4 weeks  3. DVT prophylaxis    ESTIMATED BLOOD LOSS:   200 ccs    INTRAOPERATIVE FLUIDS:   Per anesthesia record    SPONGE/INSTRUMENT/NEEDLE COUNTS:   Correct x 2    CONDITION ON DISCHARGE:   Stable    COMPLICATIONS:   none    Gilberto Roewll MD, MSE

## 2025-01-21 NOTE — DISCHARGE INSTR - APPOINTMENTS
You have a physical therapy appointment at [facility] OP PT on 1/24/2025 @ 10:30am. If you have any questions regarding this appointment you can reach them at [phone].

## 2025-01-21 NOTE — H&P
[facility]   HISTORY AND PHYSICAL    Patient Name:Ernst Clark  : 1965  MRN: [id number]  Primary Care Physician: Дмитрий Villasenor MD  Date of admission: 2025    Subjective   Subjective     Chief Complaint: left hip pain/OA    History of Present Illness   Ernst Clark is a 59 y.o. male who presents to the hospital today for left hip pain and osteoarthritis.  Patient states the hip pain is located anteriorly in the groin and is worse with activity and better with rest.  The patient has exhausted conservative treatments.  The patient states that their quality of life is suffered significantly.  The patient was seen by me in the office at an earlier date and was scheduled for left total hip arthroplasty.  The patient presents to the hospital today for that procedure.       Review of Systems   Musculoskeletal:  Positive for arthralgias, gait problem, joint swelling and myalgias.   All other systems reviewed and are negative.        Personal History     Past Medical History:   Diagnosis Date    Arthritis     Elevated cholesterol     Hip pain, bilateral     Hypertension     Left shoulder pain        Past Surgical History:   Procedure Laterality Date    KNEE CARTILAGE SURGERY Right     pt states over 20 years ago       Family History: His family history is not on file.     Social History: He  reports that he has been smoking cigarettes. He has been exposed to tobacco smoke. His smokeless tobacco use includes snuff. He reports that he does not currently use alcohol. He reports that he does not use drugs.    Home Medications:  amLODIPine, aspirin, gabapentin, losartan-hydrochlorothiazide, meloxicam, ondansetron, oxyCODONE-acetaminophen, rosuvastatin, sennosides-docusate, and sulfamethoxazole-trimethoprim    Allergies:  He has No Known Allergies.    Objective    Objective     Vitals:    Temp:  [98.3 °F (36.8 °C)] 98.3 °F (36.8 °C)  Heart Rate:  [88] 88  Resp:  [16] 16  BP: (174)/(118)  174/118    Physical Exam   Left Hip Exam      Tenderness   The patient is experiencing tenderness in the anterior and lateral.     Range of Motion   Flexion:  abnormal   External rotation:  abnormal   Internal rotation: abnormal      Muscle Strength   Flexion: 3/5      Tests   JENNI: positive  Fadir:  Positive FADIR test     Other   Erythema: absent  Scars: absent  Sensation: normal  Pulse: present       Result Review    Result Review:  I have personally reviewed the results from the time of this admission to 1/22/2025 08:24 EST and agree with these findings:  []  Laboratory list / accordion  []  Microbiology  []  Radiology  []  EKG/Telemetry   []  Cardiology/Vascular   []  Pathology  []  Old records  []  Other:  Most notable findings include:   Imaging: 3 views of the bilateral hip and pelvis   Indication: bilateral hip pain  Findings: X-rays demonstrate no acute osseous abnormality.  There is no signs of fracture dislocation or subluxation.  There is loss of joint space narrowing, subchondral sclerosis, cystic changes, and periarticular osteophytes.  There is femoral head flattening and what appears to be JERARDO of the left hip abductor  Comparative studies: None      Assessment & Plan   Assessment / Plan     Brief Patient Summary:  Ernst Clark is a 59 y.o. male with left hip OA    Active Hospital Problems:  Active Hospital Problems    Diagnosis     **Primary osteoarthritis of left hip      Plan:   Cc: f/u left hip pain, DJD    Patient presented to the hopsital today for scheduled left total hip arthroplasty.    I reviewed anatomy and a model of a total hip arthroplasty, as well as typical postoperative recovery of 6-12 months before maximal recovery, and possible need for rehabilitation stay after hospitalization. We also discussed risks, benefits, and alternatives of procedure with risks including but not limited to neurovascular damage, bleeding, infection, malalignment, chronic pain, leg length discrepancy,  failure of implants, osteolysis, loosening of implants, loss of motion, weakness, stiffness, instability, DVT, pulmonary embolus, death, stroke, complex regional pain syndrome, myocardial infarction, and need for additional procedures. Patient understood all these and had all questions answered before consenting to the procedure. No guarantees were given in regards to results from the surgery.  Patient has been medically optimize by his primary care physician.    Implants: Covarrubias and nephew Polar Press Fit   Anticoagulation: Asprin  Antibiotics: Cefazolin and Vanc  Post op ABX: Yes  TXA: Yes  Admission Type: Same Day    All remaining questions answered today.     VTE Prophylaxis:  Mechanical VTE prophylaxis orders are signed & held. Mechanical VTE prophylaxis orders are present.        Gilberto Rowell MD

## 2025-01-21 NOTE — DISCHARGE INSTRUCTIONS
Total Hip Replacement Discharge Instructions:    I. ACTIVITIES:  1. Exercises:  Complete exercise program as taught by the hospital physical therapist 2 times per day  Exercise program will be advanced by the physical therapist  During the day be up ambulating every 2 hours (while awake) for short distances  Complete the ankle pump exercises at least 10 times per hour (while awake)  Elevate legs when in bed and for at least 30 minutes during the day.Use cold packs 20-30 minutes approximately 5 times per day. This should be done before and after completing your exercises and at any time you are experiencing pain/ stiffness in your operative extremity.      2. Activities of Daily Living:  No tub baths, hot tubs, or swimming pools for 4 weeks  May shower and let water run over the incision on post-operative day #5 if no drainage. Do not scrub or rub the incision. Simply let the water run over the incision and pat dry.    II. Restrictions  Continue hip precautions as taught at the hospital  Your surgeon will discuss with you when you will be able to drive again.  Weight bearing is as tolerated  First week stay inside on even terrain. May go up and down stairs one stair at a time utilizing the hand rail once cleared by physical therapy to do so.  After one week, you may venture outside (if cleared to do so by physical therapist).    III. Precautions:  Everyone that comes near you should wash their hands  No elective dental, genital-urinary, or colon procedures or surgical procedures for 12 weeks after surgery unless absolutely necessary.   If dental work or surgical procedure is deemed absolutely necessary, you will need to contact your surgeon as you will need to take antibiotics 1 hour prior to any dental work (including teeth cleanings).  Please discuss with your surgeon prophylactic antibiotics as the length of time this intervention will be necessary for you varies with each patient’s health history and  situation.  Avoid sick people. If you must be around someone who is ill, they should wear a mask.  Avoid visits to the Emergency Room or Urgent Care unless you are having a life threatening event.   If ordered stockings are to be placed on in the morning and removed at night. Monitor the stockings to ensure that any swelling is not causing the stockings to become too tight. In this case, remove stockings immediately.    IV. INCISION CARE:  The dressing placed during surgery is waterproof and should remain in place for 5 to 7 days.  May shower with waterproof dressing on and allowed water to run over the dressing  Remove the dressing around day 5-7  There is Steri-Strips underneath your dressing which may stay on and will eventually fall off on their own  After dressing removal on day 7 May shower and allow water to run over the incision  No soaking or submersion of the incision in tubs pools hot tubs etc.  No creams or ointments to the incision  Do not touch or pick at the incision  Check incision every day and notify surgeon immediately if any of the following signs or symptoms are noted:  Increase in redness  Increase in swelling around the incision and of the entire extremity  Increase in pain  Drainage oozing from the incision  Pulling apart of the edges of the incision  Increase in overall body temperature (greater than 100.5 degrees)  Your surgeon will instruct you regarding suture or staple removal    V. Medications:   1. Anticoagulants: You will be discharged on an anticoagulant. This is a prophylactic medication that helps prevent blood clots during your post-operative period. The type and length of dosage varies based on your individual needs, procedure performed, and surgeon’s preference.  While taking the anticoagulant, you should avoid taking any additional aspirin, ibuprofen (Advil or Motrin), Aleve (Naprosyn) or other non-steroidal anti-inflammatory medications.   Notify surgeon immediately if any  carlo bleeding is noted in the urine, stool, emesis, or from the nose or the incision. Blood in the stool will often appear as black rather than red. Blood in urine may appear as pink. Blood in emesis may appear as brown/black like coffee grounds.  You will need to apply pressure for longer periods of time to any cuts or abrasions to stop bleeding  Avoid alcohol while taking anticoagulants    2. Stool Softeners: You will be at greater risk of constipation after surgery due to being less mobile and the pain medications.   Take stool softeners as instructed by your surgeon while on pain medications. Over the counter Colace 100 mg 1-2 capsules twice daily.   If stools become too loose or too frequent, please decreases the dosage or stop the stool softener.  If constipation occurs despite use of stool softeners, you are to continue the stool softeners and add a laxative (Milk of Magnesia 1 ounce daily as needed)  Drink plenty of fluids, and eat fruits and vegetables during your recovery time    3. Pain Medications utilized after surgery are narcotics and the law requires that the following information be given to all patients that are prescribed narcotics:  CLASSIFICATION: Pain medications are called Opioids and are narcotics  LEGALITIES: It is illegal to share narcotics with others and to drive within 24 hours of taking narcotics  POTENTIAL SIDE EFFECTS: Potential side effects of opioids include: nausea, vomiting, itching, dizziness, drowsiness, dry mouth, constipation, and difficulty urinating.  POTENTIAL ADVERSE EFFECTS:   Opioid tolerance can develop with use of pain medications and this simply means that it requires more and more of the medication to control pain; however, this is seen more in patients that use opioids for longer periods of time.  Opioid dependence can develop with use of Opioids and this simply means that to stop the medication can cause withdrawal symptoms; however, this is seen with patients  that use Opioids for longer periods of time.  Opioid addiction can develop with use of Opioids and the incidence of this is very unlikely in patients who take the medications as ordered and stop the medications as instructed.  Opioid overdose can be dangerous, but is unlikely when the medication is taken as ordered and stopped when ordered. It is important not to mix opioids with alcohol or with and type of sedative such as Benadryl as this can lead to over sedation and respiratory difficulty.  DOSAGE:   Pain medications will need to be taken consistently for the first week to decrease pain and promote adequate pain relief and participation in physical therapy.  After the initial surgical pain begins to resolve, you may begin to decrease the pain medication. By the end of 6 weeks, you should be off of pain medications.  Refills will not be given by the office during evening hours, on weekends, or after 6 weeks post-op.  To seek refills on pain medications during the initial 6 week post-operative period, you must call the office 48 hours in advance to request the refill. The office will then notify you when to  the prescription. DO NOT wait until you are out of the medication to request a refill.    V. FOLLOW-UP VISITS:  You will need to follow up in the office with your surgeon in  2 weeks. Please call this number [phone]  to schedule this appointment.  If you have any concerns or suspected complications prior to your follow up visit, please call your surgeons office. Do not wait until your appointment time if you suspect complications. These will need to be addressed in the office promptly.

## 2025-01-21 NOTE — CASE MANAGEMENT/SOCIAL WORK
Continued Stay Note  LADAN Stacy     Patient Name: Ernst Clark  MRN: [id number]  Today's Date: 1/21/2025    Admit Date: (Not on file)        Discharge Plan       Row Name 01/21/25 1518       Plan    Plan Comments Patient states he will  need a rolling walker and BSC at discharge and referral was placed in [facility] for [facility]. Patient states he would like to do OP PT at [facility] and is agreeable for CM to schedule this appointment. CM spoke with Raine at [facility] OP PT and she has scheduled an appointment for 1/24/25 @ 10:30am. CM will follow.                   Discharge Codes    No documentation.                       Mya Del Cid RN

## 2025-01-22 ENCOUNTER — ANESTHESIA (OUTPATIENT)
Dept: PERIOP | Facility: HOSPITAL | Age: 60
End: 2025-01-22
Payer: COMMERCIAL

## 2025-01-22 ENCOUNTER — APPOINTMENT (OUTPATIENT)
Dept: GENERAL RADIOLOGY | Facility: HOSPITAL | Age: 60
End: 2025-01-22
Payer: COMMERCIAL

## 2025-01-22 ENCOUNTER — HOSPITAL ENCOUNTER (OUTPATIENT)
Facility: HOSPITAL | Age: 60
Setting detail: HOSPITAL OUTPATIENT SURGERY
Discharge: HOME | End: 2025-01-22
Attending: INTERNAL MEDICINE | Admitting: INTERNAL MEDICINE
Payer: COMMERCIAL

## 2025-01-22 VITALS
TEMPERATURE: 97.7 F | HEART RATE: 120 BPM | OXYGEN SATURATION: 99 % | WEIGHT: 162.2 LBS | SYSTOLIC BLOOD PRESSURE: 154 MMHG | BODY MASS INDEX: 20.27 KG/M2 | RESPIRATION RATE: 18 BRPM | DIASTOLIC BLOOD PRESSURE: 110 MMHG

## 2025-01-22 DIAGNOSIS — M16.12 PRIMARY OSTEOARTHRITIS OF LEFT HIP: ICD-10-CM

## 2025-01-22 DIAGNOSIS — Z96.642 S/P HIP REPLACEMENT, LEFT: Primary | ICD-10-CM

## 2025-01-22 PROCEDURE — C1713 ANCHOR/SCREW BN/BN,TIS/BN: HCPCS | Performed by: INTERNAL MEDICINE

## 2025-01-22 PROCEDURE — 25810000003 SODIUM CHLORIDE 0.9 % SOLUTION 250 ML FLEX CONT: Performed by: INTERNAL MEDICINE

## 2025-01-22 PROCEDURE — 25010000002 MIDAZOLAM PER 1MG: Performed by: NURSE ANESTHETIST, CERTIFIED REGISTERED

## 2025-01-22 PROCEDURE — 25010000002 HYDROMORPHONE 1 MG/ML SOLUTION: Performed by: NURSE ANESTHETIST, CERTIFIED REGISTERED

## 2025-01-22 PROCEDURE — 72170 X-RAY EXAM OF PELVIS: CPT

## 2025-01-22 PROCEDURE — 25010000002 ONDANSETRON PER 1 MG: Performed by: NURSE ANESTHETIST, CERTIFIED REGISTERED

## 2025-01-22 PROCEDURE — C1776 JOINT DEVICE (IMPLANTABLE): HCPCS | Performed by: INTERNAL MEDICINE

## 2025-01-22 PROCEDURE — 97161 PT EVAL LOW COMPLEX 20 MIN: CPT

## 2025-01-22 PROCEDURE — 73502 X-RAY EXAM HIP UNI 2-3 VIEWS: CPT

## 2025-01-22 PROCEDURE — 25010000002 DEXAMETHASONE PER 1 MG: Performed by: INTERNAL MEDICINE

## 2025-01-22 PROCEDURE — 25810000003 LACTATED RINGERS PER 1000 ML: Performed by: NURSE ANESTHETIST, CERTIFIED REGISTERED

## 2025-01-22 PROCEDURE — 25010000002 KETOROLAC TROMETHAMINE PER 15 MG: Performed by: INTERNAL MEDICINE

## 2025-01-22 PROCEDURE — 25010000002 EPINEPHRINE 1 MG/ML SOLUTION 1 ML VIAL: Performed by: INTERNAL MEDICINE

## 2025-01-22 PROCEDURE — 25010000002 PHENYLEPHRINE 10 MG/ML SOLUTION: Performed by: NURSE ANESTHETIST, CERTIFIED REGISTERED

## 2025-01-22 PROCEDURE — 25010000002 MEPIVACAINE PF 2 % SOLUTION 20 ML VIAL: Performed by: NURSE ANESTHETIST, CERTIFIED REGISTERED

## 2025-01-22 PROCEDURE — 25010000002 PROPOFOL 200 MG/20ML EMULSION: Performed by: NURSE ANESTHETIST, CERTIFIED REGISTERED

## 2025-01-22 PROCEDURE — 25010000002 LIDOCAINE 2% SOLUTION: Performed by: NURSE ANESTHETIST, CERTIFIED REGISTERED

## 2025-01-22 PROCEDURE — 25010000002 CEFAZOLIN PER 500 MG: Performed by: INTERNAL MEDICINE

## 2025-01-22 PROCEDURE — 25010000002 VANCOMYCIN HCL 1.25 G RECONSTITUTED SOLUTION 1 EACH VIAL: Performed by: INTERNAL MEDICINE

## 2025-01-22 PROCEDURE — 76000 FLUOROSCOPY <1 HR PHYS/QHP: CPT

## 2025-01-22 PROCEDURE — 25010000002 ROPIVACAINE PER 1 MG: Performed by: INTERNAL MEDICINE

## 2025-01-22 DEVICE — REFLECTION SPHERICAL HEAD SCREW 30MM
Type: IMPLANTABLE DEVICE | Site: HIP | Status: FUNCTIONAL
Brand: REFLECTION

## 2025-01-22 DEVICE — VIOLET ANTIBACTERIAL POLYDIOXANONE, KNOTLESS TISSUE CONTROL DEVICE
Type: IMPLANTABLE DEVICE | Site: HIP | Status: FUNCTIONAL
Brand: STRATAFIX

## 2025-01-22 DEVICE — POLARSTEM COLLAR STANDARD                                    NON-CEMENTED WITH TI/HA 4
Type: IMPLANTABLE DEVICE | Site: HIP | Status: FUNCTIONAL
Brand: POLARSTEM

## 2025-01-22 DEVICE — IMPLANTABLE DEVICE: Type: IMPLANTABLE DEVICE | Site: HIP | Status: FUNCTIONAL

## 2025-01-22 DEVICE — OR3O DUAL MOBILITY XLPE INSERT 28/46
Type: IMPLANTABLE DEVICE | Site: HIP | Status: FUNCTIONAL
Brand: OR3O DUAL MOBILITY

## 2025-01-22 DEVICE — OXINIUM FEMORAL HEAD 12/14 TAPER                                    28 MM +4
Type: IMPLANTABLE DEVICE | Site: HIP | Status: FUNCTIONAL
Brand: OXINIUM

## 2025-01-22 DEVICE — R3 3 HOLE ACETABULAR SHELL 60MM
Type: IMPLANTABLE DEVICE | Site: HIP | Status: FUNCTIONAL
Brand: R3 ACETABULAR

## 2025-01-22 DEVICE — OR3O DUAL MOBILITY LINER 46/60
Type: IMPLANTABLE DEVICE | Site: HIP | Status: FUNCTIONAL
Brand: OR3O DUAL MOBILITY

## 2025-01-22 DEVICE — REFLECTION SPHERICAL HEAD SCREW 40MM
Type: IMPLANTABLE DEVICE | Site: HIP | Status: FUNCTIONAL
Brand: REFLECTION

## 2025-01-22 DEVICE — KNOTLESS TISSUE CONTROL DEVICE, UNDYED UNIDIRECTIONAL (ANTIBACTERIAL) SYNTHETIC ABSORBABLE DEVICE
Type: IMPLANTABLE DEVICE | Site: HIP | Status: FUNCTIONAL
Brand: STRATAFIX

## 2025-01-22 RX ORDER — LIDOCAINE HYDROCHLORIDE 10 MG/ML
0.5 INJECTION, SOLUTION EPIDURAL; INFILTRATION; INTRACAUDAL; PERINEURAL ONCE AS NEEDED
Status: DISCONTINUED | OUTPATIENT
Start: 2025-01-22 | End: 2025-01-22 | Stop reason: HOSPADM

## 2025-01-22 RX ORDER — MIDAZOLAM HYDROCHLORIDE 2 MG/2ML
INJECTION, SOLUTION INTRAMUSCULAR; INTRAVENOUS AS NEEDED
Status: DISCONTINUED | OUTPATIENT
Start: 2025-01-22 | End: 2025-01-22 | Stop reason: SURG

## 2025-01-22 RX ORDER — CHLORHEXIDINE GLUCONATE 500 MG/1
CLOTH TOPICAL ONCE
Status: DISCONTINUED | OUTPATIENT
Start: 2025-01-22 | End: 2025-01-22 | Stop reason: HOSPADM

## 2025-01-22 RX ORDER — PHENYLEPHRINE HYDROCHLORIDE 10 MG/ML
INJECTION INTRAVENOUS AS NEEDED
Status: DISCONTINUED | OUTPATIENT
Start: 2025-01-22 | End: 2025-01-22 | Stop reason: SURG

## 2025-01-22 RX ORDER — LIDOCAINE HYDROCHLORIDE 20 MG/ML
INJECTION, SOLUTION INFILTRATION; PERINEURAL AS NEEDED
Status: DISCONTINUED | OUTPATIENT
Start: 2025-01-22 | End: 2025-01-22 | Stop reason: SURG

## 2025-01-22 RX ORDER — ONDANSETRON 4 MG/1
4 TABLET, FILM COATED ORAL EVERY 8 HOURS PRN
Qty: 15 TABLET | Refills: 0 | Status: SHIPPED | OUTPATIENT
Start: 2025-01-22

## 2025-01-22 RX ORDER — SODIUM CHLORIDE 0.9 % (FLUSH) 0.9 %
10 SYRINGE (ML) INJECTION EVERY 12 HOURS SCHEDULED
Status: DISCONTINUED | OUTPATIENT
Start: 2025-01-22 | End: 2025-01-22 | Stop reason: HOSPADM

## 2025-01-22 RX ORDER — SODIUM CHLORIDE, SODIUM LACTATE, POTASSIUM CHLORIDE, CALCIUM CHLORIDE 600; 310; 30; 20 MG/100ML; MG/100ML; MG/100ML; MG/100ML
100 INJECTION, SOLUTION INTRAVENOUS ONCE
Status: DISCONTINUED | OUTPATIENT
Start: 2025-01-22 | End: 2025-01-22 | Stop reason: HOSPADM

## 2025-01-22 RX ORDER — OXYCODONE AND ACETAMINOPHEN 7.5; 325 MG/1; MG/1
1 TABLET ORAL EVERY 4 HOURS PRN
Qty: 30 TABLET | Refills: 0 | Status: SHIPPED | OUTPATIENT
Start: 2025-01-22

## 2025-01-22 RX ORDER — SULFAMETHOXAZOLE AND TRIMETHOPRIM 800; 160 MG/1; MG/1
1 TABLET ORAL 2 TIMES DAILY
Qty: 14 TABLET | Refills: 0 | Status: SHIPPED | OUTPATIENT
Start: 2025-01-22 | End: 2025-01-29

## 2025-01-22 RX ORDER — FENTANYL CITRATE 50 UG/ML
50 INJECTION, SOLUTION INTRAMUSCULAR; INTRAVENOUS
Status: DISCONTINUED | OUTPATIENT
Start: 2025-01-22 | End: 2025-01-22 | Stop reason: HOSPADM

## 2025-01-22 RX ORDER — HYDROCODONE BITARTRATE AND ACETAMINOPHEN 5; 325 MG/1; MG/1
1 TABLET ORAL ONCE AS NEEDED
Status: COMPLETED | OUTPATIENT
Start: 2025-01-22 | End: 2025-01-22

## 2025-01-22 RX ORDER — AMOXICILLIN 250 MG
1 CAPSULE ORAL DAILY
Qty: 30 TABLET | Refills: 0 | Status: SHIPPED | OUTPATIENT
Start: 2025-01-22

## 2025-01-22 RX ORDER — DEXAMETHASONE SODIUM PHOSPHATE 10 MG/ML
8 INJECTION, SOLUTION INTRAMUSCULAR; INTRAVENOUS ONCE AS NEEDED
Status: DISCONTINUED | OUTPATIENT
Start: 2025-01-22 | End: 2025-01-22

## 2025-01-22 RX ORDER — SODIUM CHLORIDE, SODIUM LACTATE, POTASSIUM CHLORIDE, CALCIUM CHLORIDE 600; 310; 30; 20 MG/100ML; MG/100ML; MG/100ML; MG/100ML
9 INJECTION, SOLUTION INTRAVENOUS CONTINUOUS
Status: DISCONTINUED | OUTPATIENT
Start: 2025-01-22 | End: 2025-01-22 | Stop reason: HOSPADM

## 2025-01-22 RX ORDER — ASPIRIN 81 MG/1
81 TABLET ORAL 2 TIMES DAILY
Qty: 60 TABLET | Refills: 0 | Status: SHIPPED | OUTPATIENT
Start: 2025-01-22

## 2025-01-22 RX ORDER — MAGNESIUM HYDROXIDE 1200 MG/15ML
LIQUID ORAL AS NEEDED
Status: DISCONTINUED | OUTPATIENT
Start: 2025-01-22 | End: 2025-01-22 | Stop reason: HOSPADM

## 2025-01-22 RX ORDER — DEXAMETHASONE SODIUM PHOSPHATE 4 MG/ML
8 INJECTION, SOLUTION INTRA-ARTICULAR; INTRALESIONAL; INTRAMUSCULAR; INTRAVENOUS; SOFT TISSUE ONCE AS NEEDED
Status: COMPLETED | OUTPATIENT
Start: 2025-01-22 | End: 2025-01-22

## 2025-01-22 RX ORDER — SODIUM CHLORIDE 0.9 % (FLUSH) 0.9 %
10 SYRINGE (ML) INJECTION AS NEEDED
Status: DISCONTINUED | OUTPATIENT
Start: 2025-01-22 | End: 2025-01-22 | Stop reason: HOSPADM

## 2025-01-22 RX ORDER — TRANEXAMIC ACID 10 MG/ML
1000 INJECTION, SOLUTION INTRAVENOUS ONCE
Status: COMPLETED | OUTPATIENT
Start: 2025-01-22 | End: 2025-01-22

## 2025-01-22 RX ORDER — ONDANSETRON 2 MG/ML
4 INJECTION INTRAMUSCULAR; INTRAVENOUS ONCE AS NEEDED
Status: COMPLETED | OUTPATIENT
Start: 2025-01-22 | End: 2025-01-22

## 2025-01-22 RX ORDER — PROPOFOL 10 MG/ML
INJECTION, EMULSION INTRAVENOUS CONTINUOUS PRN
Status: DISCONTINUED | OUTPATIENT
Start: 2025-01-22 | End: 2025-01-22 | Stop reason: SURG

## 2025-01-22 RX ORDER — SODIUM CHLORIDE 9 MG/ML
40 INJECTION, SOLUTION INTRAVENOUS AS NEEDED
Status: DISCONTINUED | OUTPATIENT
Start: 2025-01-22 | End: 2025-01-22 | Stop reason: HOSPADM

## 2025-01-22 RX ORDER — EPHEDRINE SULFATE 50 MG/ML
INJECTION INTRAVENOUS AS NEEDED
Status: DISCONTINUED | OUTPATIENT
Start: 2025-01-22 | End: 2025-01-22 | Stop reason: SURG

## 2025-01-22 RX ORDER — MIDAZOLAM HYDROCHLORIDE 2 MG/2ML
1 INJECTION, SOLUTION INTRAMUSCULAR; INTRAVENOUS
Status: DISCONTINUED | OUTPATIENT
Start: 2025-01-22 | End: 2025-01-22 | Stop reason: HOSPADM

## 2025-01-22 RX ORDER — FAMOTIDINE 10 MG/ML
20 INJECTION, SOLUTION INTRAVENOUS
Status: COMPLETED | OUTPATIENT
Start: 2025-01-22 | End: 2025-01-22

## 2025-01-22 RX ORDER — ONDANSETRON 2 MG/ML
4 INJECTION INTRAMUSCULAR; INTRAVENOUS ONCE AS NEEDED
Status: DISCONTINUED | OUTPATIENT
Start: 2025-01-22 | End: 2025-01-22 | Stop reason: HOSPADM

## 2025-01-22 RX ORDER — PREGABALIN 75 MG/1
150 CAPSULE ORAL ONCE
Status: COMPLETED | OUTPATIENT
Start: 2025-01-22 | End: 2025-01-22

## 2025-01-22 RX ADMIN — MIDAZOLAM HYDROCHLORIDE 1 MG: 1 INJECTION, SOLUTION INTRAMUSCULAR; INTRAVENOUS at 10:38

## 2025-01-22 RX ADMIN — ONDANSETRON 4 MG: 2 INJECTION INTRAMUSCULAR; INTRAVENOUS at 08:18

## 2025-01-22 RX ADMIN — LIDOCAINE HYDROCHLORIDE 100 MG: 20 INJECTION, SOLUTION INFILTRATION; PERINEURAL at 10:39

## 2025-01-22 RX ADMIN — FAMOTIDINE 20 MG: 10 INJECTION INTRAVENOUS at 08:18

## 2025-01-22 RX ADMIN — DEXAMETHASONE SODIUM PHOSPHATE 8 MG: 4 INJECTION INTRA-ARTICULAR; INTRALESIONAL; INTRAMUSCULAR; INTRAVENOUS; SOFT TISSUE at 08:44

## 2025-01-22 RX ADMIN — HYDROCODONE BITARTRATE AND ACETAMINOPHEN 1 TABLET: 5; 325 TABLET ORAL at 12:32

## 2025-01-22 RX ADMIN — MIDAZOLAM HYDROCHLORIDE 1 MG: 1 INJECTION, SOLUTION INTRAMUSCULAR; INTRAVENOUS at 10:41

## 2025-01-22 RX ADMIN — SODIUM CHLORIDE, POTASSIUM CHLORIDE, SODIUM LACTATE AND CALCIUM CHLORIDE 9 ML/HR: 600; 310; 30; 20 INJECTION, SOLUTION INTRAVENOUS at 08:18

## 2025-01-22 RX ADMIN — HYDROMORPHONE HYDROCHLORIDE 0.5 MG: 1 INJECTION, SOLUTION INTRAMUSCULAR; INTRAVENOUS; SUBCUTANEOUS at 12:30

## 2025-01-22 RX ADMIN — PROPOFOL 80 MCG/KG/MIN: 10 INJECTION, EMULSION INTRAVENOUS at 10:39

## 2025-01-22 RX ADMIN — TRANEXAMIC ACID 1000 MG: 10 INJECTION, SOLUTION INTRAVENOUS at 11:53

## 2025-01-22 RX ADMIN — MIDAZOLAM HYDROCHLORIDE 1 MG: 1 INJECTION, SOLUTION INTRAMUSCULAR; INTRAVENOUS at 10:15

## 2025-01-22 RX ADMIN — CEFAZOLIN 2 G: 2 INJECTION, POWDER, FOR SOLUTION INTRAVENOUS at 10:39

## 2025-01-22 RX ADMIN — MEPIVACAINE HYDROCHLORIDE 2.25 MG: 20 INJECTION, SOLUTION EPIDURAL; INFILTRATION at 10:20

## 2025-01-22 RX ADMIN — EPHEDRINE SULFATE 5 MG: 50 INJECTION INTRAVENOUS at 11:16

## 2025-01-22 RX ADMIN — PREGABALIN 150 MG: 75 CAPSULE ORAL at 08:16

## 2025-01-22 RX ADMIN — TRANEXAMIC ACID 1000 MG: 10 INJECTION, SOLUTION INTRAVENOUS at 10:45

## 2025-01-22 RX ADMIN — PHENYLEPHRINE HYDROCHLORIDE 100 MCG: 10 INJECTION INTRAVENOUS at 11:40

## 2025-01-22 RX ADMIN — HYDROMORPHONE HYDROCHLORIDE 0.5 MG: 1 INJECTION, SOLUTION INTRAMUSCULAR; INTRAVENOUS; SUBCUTANEOUS at 12:21

## 2025-01-22 RX ADMIN — VANCOMYCIN HYDROCHLORIDE 1250 MG: 1.25 INJECTION, POWDER, LYOPHILIZED, FOR SOLUTION INTRAVENOUS at 09:19

## 2025-01-22 NOTE — PLAN OF CARE
Goal Outcome Evaluation:  Plan of Care Reviewed With: patient           Outcome Evaluation: PT Evaluation Complete: patient performs sit to/from stand transfers with CGA and gait x 100 feet with CGA with use of FWW. Patient requires cues for improved safety with device. No loss of balance or knee buckling noted. Patient has no concerns for return home and plans to follow up with outpatient PT at discharge. No further inpatient skilled PT needs at this time, anticipate return home today.    Anticipated Discharge Disposition (PT): home with outpatient therapy services

## 2025-01-22 NOTE — ANESTHESIA PROCEDURE NOTES
Spinal Block      Patient reassessed immediately prior to procedure    Patient location during procedure: pre-op  Start Time: 1/22/2025 10:19 AM  Stop Time: 1/22/2025 10:20 AM  Indication:at surgeon's request and post-op pain management  Performed By  CRNA/CAA: Naida Liu CRNA  Preanesthetic Checklist  Completed: patient identified, IV checked, site marked, risks and benefits discussed, surgical consent, monitors and equipment checked, pre-op evaluation and timeout performed  Spinal Block Prep:  Patient Position:sitting  Sterile Tech:cap, gloves, mask and sterile barriers  Prep:Chloraprep  Patient Monitoring:blood pressure monitoring, continuous pulse oximetry and EKG    Spinal Block Procedure  Approach:midline  Guidance:landmark technique and palpation technique  Location:L3-L4  Needle Type:Sprotte  Needle Gauge:25 G  Placement of Spinal needle event:cerebrospinal fluid aspirated  Paresthesia: no  Fluid Appearance:clear     Post Assessment  Patient Tolerance:patient tolerated the procedure well with no apparent complications  Complications no

## 2025-01-22 NOTE — THERAPY DISCHARGE NOTE
Patient Name: Ernst Clark  : 1965    MRN: [id number]                              Today's Date: 2025       Admit Date: 2025    Visit Dx:     ICD-10-CM ICD-9-CM   1. S/P hip replacement, left  Z96.642 V43.64   2. Primary osteoarthritis of left hip  M16.12 715.15     Patient Active Problem List   Diagnosis    Primary osteoarthritis of right hip    Primary osteoarthritis of left hip     Past Medical History:   Diagnosis Date    Arthritis     Elevated cholesterol     Hip pain, bilateral     Hypertension     Left shoulder pain      Past Surgical History:   Procedure Laterality Date    KNEE CARTILAGE SURGERY Right     pt states over 20 years ago      General Information       Row Name 25 1443          Physical Therapy Time and Intention    Document Type discharge evaluation/summary  -BP     Mode of Treatment physical therapy  -BP       Row Name 25 1443          General Information    Patient Profile Reviewed yes  Patient s/p L CHERYL. WBAT with anterior hip precautions x 4 weeks.  -BP     Prior Level of Function --  patient reports independence with mobility with use of crutches prior to surgery.  -BP     Existing Precautions/Restrictions fall;hip, anterior;left  -BP     Barriers to Rehab none identified  -BP       Row Name 25 1443          Living Environment    People in Home significant other  -BP       Row Name 25 1443          Home Main Entrance    Number of Stairs, Main Entrance two  -BP     Stair Railings, Main Entrance --  one handrail  -BP       Row Name 25 1443          Stairs Within Home, Primary    Stairs, Within Home, Primary one story home  -BP       Row Name 25 1443          Cognition    Orientation Status (Cognition) --  WFL  -BP       Row Name 25 1443          Safety Issues/Impairments Affecting Functional Mobility    Safety Issues Affecting Function (Mobility) positioning of assistive device  -BP               User Key  (r) = Recorded By, (t)  = Taken By, (c) = Cosigned By      Initials Name Provider Type    Earl Martinez, MILES Physical Therapist                   Mobility       Row Name 01/22/25 1445          Bed Mobility    Comment, (Bed Mobility) deferred, patient at EOB  -       Row Name 01/22/25 1445          Transfers    Comment, (Transfers) Verbal cues for hand placement  -       Row Name 01/22/25 1445          Sit-Stand Transfer    Sit-Stand [place] (Transfers) contact guard;verbal cues  -BP     Assistive Device (Sit-Stand Transfers) walker, front-wheeled  -BP       Row Name 01/22/25 1445          Gait/Stairs (Locomotion)    [place] Level (Gait) contact guard;verbal cues  -BP     Assistive Device (Gait) walker, front-wheeled  -BP     Patient was able to Ambulate yes  -BP     Distance in Feet (Gait) 100  -BP     Deviations/Abnormal Patterns (Gait) antalgic;gait speed decreased;stride length decreased  -BP     Bilateral Gait Deviations forward flexed posture  -BP     Comment, (Gait/Stairs) Patient requires cues for improved use of device initially, able to correct and maintain. No loss of balance noted.  -BP               User Key  (r) = Recorded By, (t) = Taken By, (c) = Cosigned By      Initials Name Provider Type    Earl Martinez PT Physical Therapist                   Obj/Interventions       Row Name 01/22/25 1447          Range of Motion Comprehensive    Comment, General Range of Motion R LE AROM WFL. L knee and ankle ROM WFL.  -       Row Name 01/22/25 1447          Strength Comprehensive (MMT)    Comment, General Manual Muscle Testing (MMT) Assessment R LE strength functional. L LE strength not assessed, no knee buckling noted in standing or during gait  -       Row Name 01/22/25 1447          Balance    Comment, Balance sitting balance-supervision. Standing balance-CGA with device  -               User Key  (r) = Recorded By, (t) = Taken By, (c) = Cosigned By      Initials Name Provider Type    BP Izquierdo  Earl, PT Physical Therapist                   Goals/Plan    No documentation.                  Clinical Impression       Row Name 01/22/25 1448          Pain    Pretreatment Pain Rating 6/10  -BP     Posttreatment Pain Rating 6/10  -BP     Pain Location hip  -BP     Pain Side/Orientation left  -BP     Pain Management Interventions exercise or physical activity utilized  -BP     Response to Pain Interventions intervention effective per patient report  -BP       Row Name 01/22/25 1448          Plan of Care Review    Plan of Care Reviewed With patient  -BP     Outcome Evaluation PT Evaluation Complete: patient performs sit to/from stand transfers with CGA and gait x 100 feet with CGA with use of FWW. Patient requires cues for improved safety with device. No loss of balance or knee buckling noted. Patient has no concerns for return home and plans to follow up with outpatient PT at discharge. No further inpatient skilled PT needs at this time, anticipate return home today.  -BP       Row Name 01/22/25 1448          Therapy Assessment/Plan (PT)    Criteria for Skilled Interventions Met (PT) no problems identified which require skilled intervention  -BP     Therapy Frequency (PT) evaluation only  -BP       Row Name 01/22/25 1448          Positioning and Restraints    Pre-Treatment Position in bed  -BP     Post Treatment Position bed  -BP     In Bed sitting EOB;notified nsg;call light within reach  -BP               User Key  (r) = Recorded By, (t) = Taken By, (c) = Cosigned By      Initials Name Provider Type    BP Earl Izquierdo, PT Physical Therapist                   Outcome Measures       Row Name 01/22/25 5389          How much help from another person do you currently need...    Turning from your back to your side while in flat bed without using bedrails? 3  -BP     Moving from lying on back to sitting on the side of a flat bed without bedrails? 3  -BP     Moving to and from a bed to a chair (including a  wheelchair)? 3  -BP     Standing up from a chair using your arms (e.g., wheelchair, bedside chair)? 3  -BP     Climbing 3-5 steps with a railing? 3  -BP     To walk in hospital room? 3  -BP     AM-PAC 6 Clicks Score (PT) 18  -BP     Highest Level of Mobility Goal 6 --> Walk 10 steps or more  -BP       Row Name 01/22/25 1450          Functional Assessment    Outcome Measure Options AM-PAC 6 Clicks Basic Mobility (PT)  -               User Key  (r) = Recorded By, (t) = Taken By, (c) = Cosigned By      Initials Name Provider Type    BP Earl Izquierdo, PT Physical Therapist                  Physical Therapy Education       Title: PT OT SLP Therapies (Done)       Topic: Physical Therapy (Done)       Point: Mobility training (Done)       Learning Progress Summary            Patient Acceptance, E,TB, VU by  at 1/22/2025 1451    Comment: Educated patient on anterior hip precautions                      Point: Precautions (Done)       Learning Progress Summary            Patient Acceptance, E,TB, VU by  at 1/22/2025 1451    Comment: Educated patient on anterior hip precautions                                      User Key       Initials Effective Dates Name Provider Type Discipline     06/16/21 -  Earl Izquierdo, PT Physical Therapist PT                  PT Recommendation and Plan     Outcome Evaluation: PT Evaluation Complete: patient performs sit to/from stand transfers with CGA and gait x 100 feet with CGA with use of FWW. Patient requires cues for improved safety with device. No loss of balance or knee buckling noted. Patient has no concerns for return home and plans to follow up with outpatient PT at discharge. No further inpatient skilled PT needs at this time, anticipate return home today.     Time Calculation:   PT Evaluation Complexity  History, PT Evaluation Complexity: 1-2 personal factors and/or comorbidities  Examination of Body Systems (PT Eval Complexity): 1-2 elements  Clinical Presentation (PT  Evaluation Complexity): stable  Clinical Decision Making (PT Evaluation Complexity): low complexity  Overall Complexity (PT Evaluation Complexity): low complexity     PT Charges       Row Name 01/22/25 1451             Time Calculation    Start Time 1340  -BP      Stop Time 1350  -BP      Time Calculation (min) 10 min  -BP      PT Received On 01/22/25  -BP                User Key  (r) = Recorded By, (t) = Taken By, (c) = Cosigned By      Initials Name Provider Type    BP Earl Izquierdo, PT Physical Therapist                  Therapy Charges for Today       Code Description Service Date Service Provider Modifiers Qty    [id number] HC PT EVAL LOW COMPLEXITY 1 1/22/2025 Earl Izquierdo, PT GP 1            PT G-Codes  Outcome Measure Options: AM-PAC 6 Clicks Basic Mobility (PT)  AM-PAC 6 Clicks Score (PT): 18    PT Discharge Summary  Anticipated Discharge Disposition (PT): home with outpatient therapy services  Reason for Discharge: Discharge from facility  Discharge Destination: Home with outpatient services    Earl Izquierdo, PT  1/22/2025

## 2025-01-22 NOTE — ANESTHESIA POSTPROCEDURE EVALUATION
Patient: Ernst Clark    Procedure Summary       Date: 01/22/25 Room / Location:  [facility] /  [facility]    Anesthesia Start: 1034 Anesthesia Stop: 1200    Procedure: TOTAL HIP ARTHROPLASTY ANTERIOR WITH HANA TABLE (Left: Hip) Diagnosis:       Primary osteoarthritis of left hip      (Primary osteoarthritis of left hip [M16.12])    Surgeons: Gilberto Rowell MD Provider: Rosa Isela Weinstein CRNA    Anesthesia Type: spinal, MAC ASA Status: 2            Anesthesia Type: spinal, MAC    Vitals  Vitals Value Taken Time   /110 01/22/25 1336   Temp 97.7 °F (36.5 °C) 01/22/25 1210   Pulse 104 01/22/25 1325   Resp 18 01/22/25 1310   SpO2 97 % 01/22/25 1325   Vitals shown include unfiled device data.        Post Anesthesia Care and Evaluation    Patient location during evaluation: bedside  Patient participation: complete - patient participated  Level of consciousness: awake and alert  Pain score: 1  Pain management: adequate    Airway patency: patent  Anesthetic complications: No anesthetic complications  PONV Status: none  Cardiovascular status: acceptable  Respiratory status: acceptable  Hydration status: acceptable  Post Neuraxial Block status: Motor and sensory function returned to baseline and No signs or symptoms of PDPH

## 2025-01-22 NOTE — ANESTHESIA PREPROCEDURE EVALUATION
Anesthesia Evaluation     Patient summary reviewed and Nursing notes reviewed   NPO Solid Status: > 8 hours  NPO Liquid Status: > 8 hours           Airway   Mallampati: II  TM distance: >3 FB  Neck ROM: full  No difficulty expected  Dental - normal exam     Pulmonary     breath sounds clear to auscultation  (+) a smoker (1 cig very few days) Current, cigars,  Cardiovascular     ECG reviewed  Rhythm: regular  Rate: normal    (+) hypertension (states he has 2 ordered but only takes one med) well controlled 2 medications or greater, hyperlipidemia      Neuro/Psych- negative ROS  GI/Hepatic/Renal/Endo - negative ROS     Musculoskeletal     (+) chronic pain  Abdominal    Substance History      OB/GYN negative ob/gyn ROS         Other   arthritis,     ROS/Med Hx Other: HEART RATE=68  bpm  RR Jlzaqhpf=701  ms  NC Oxnohpxa=431  ms  P Horizontal Axis=6  deg  P Front Axis=-22  deg  QRSD Interval=90  ms  QT Beawammt=279  ms  QXmQ=975  ms  QRS Axis=7  deg  T Wave Axis=52  deg  - ABNORMAL ECG -  Sinus rhythm  Consider  anteroseptal infarct  NO PRIOR TRACING AVAILABLE FOR COMPARISON  Electronically Signed By: Svetlana Murphy ([facility] 2025-01-09 13:01:00  Date and Time of Study:2025-01-09 10:11:10                    Anesthesia Plan    ASA 2     spinal and MAC     intravenous induction     Anesthetic plan, risks, benefits, and alternatives have been provided, discussed and informed consent has been obtained with: patient.    Use of blood products discussed with patient  Consented to blood products.    Plan discussed with CRNA.    CODE STATUS:

## 2025-01-24 ENCOUNTER — TELEPHONE (OUTPATIENT)
Dept: ORTHOPEDIC SURGERY | Facility: CLINIC | Age: 60
End: 2025-01-24
Payer: COMMERCIAL

## 2025-02-04 ENCOUNTER — OFFICE VISIT (OUTPATIENT)
Dept: ORTHOPEDIC SURGERY | Facility: CLINIC | Age: 60
End: 2025-02-04
Payer: COMMERCIAL

## 2025-02-04 VITALS — BODY MASS INDEX: 20.14 KG/M2 | WEIGHT: 162 LBS | HEIGHT: 75 IN

## 2025-02-04 DIAGNOSIS — Z96.642 STATUS POST TOTAL REPLACEMENT OF LEFT HIP: ICD-10-CM

## 2025-02-04 DIAGNOSIS — Z96.642 S/P HIP REPLACEMENT, LEFT: ICD-10-CM

## 2025-02-04 DIAGNOSIS — M25.552 LEFT HIP PAIN: Primary | ICD-10-CM

## 2025-02-04 RX ORDER — OXYCODONE AND ACETAMINOPHEN 7.5; 325 MG/1; MG/1
1 TABLET ORAL EVERY 4 HOURS PRN
Qty: 30 TABLET | Refills: 0 | Status: SHIPPED | OUTPATIENT
Start: 2025-02-04

## 2025-02-04 RX ORDER — DOCUSATE SODIUM 100 MG/1
1 CAPSULE, LIQUID FILLED ORAL EVERY 12 HOURS SCHEDULED
COMMUNITY
Start: 2024-11-21

## 2025-02-04 NOTE — PROGRESS NOTES
Subjective: Status post left total hip arthroplasty     Patient ID: Ernst Clark is a 59 y.o. male.    Chief Complaint:    History of Present Illness 59-year-old male is 2 weeks out from his left total hip arthroplasty and is doing well.  Ambulated independently today without cane or crutch.  Is experiencing some soreness but states the pain is controlled with oral medication.  Right hip is giving him significant pain and he wants to proceed with a right total proctoplasty ASAP.  Also gives a history valvar undergone arthroscopic surgery by me in the right knee 20 years ago and is also starting to give him some pain and discomfort.       Social History     Occupational History    Not on file   Tobacco Use    Smoking status: Some Days     Types: Cigarettes     Passive exposure: Current    Smokeless tobacco: Current     Types: Snuff    Tobacco comments:     quit smoking 30 years ago     Occasional smokes and dips   Vaping Use    Vaping status: Never Used   Substance and Sexual Activity    Alcohol use: Not Currently    Drug use: Never    Sexual activity: Defer      Review of Systems      Past Medical History:   Diagnosis Date    Arthritis     Elevated cholesterol     Hip pain, bilateral     Hypertension     Left shoulder pain      Past Surgical History:   Procedure Laterality Date    KNEE CARTILAGE SURGERY Right     pt states over 20 years ago    TOTAL HIP ARTHROPLASTY Left 1/22/2025    Procedure: TOTAL HIP ARTHROPLASTY ANTERIOR WITH HANA TABLE;  Surgeon: Gilberto Rowell MD;  Location: [facility];  Service: Orthopedics;  Laterality: Left;     Family History   Problem Relation Age of Onset    [place] Hyperthermia Neg Hx          Objective:  There were no vitals filed for this visit.      02/04/25  1302   Weight: 73.5 kg (162 lb)     Body mass index is 20.25 kg/m².  BMI is within normal parameters. No other follow-up for BMI required.        Ortho Exam   AP lateral of the hip shows perfect position of the left total  hip arthroplasty.  Again he has avascular necrosis of the right hip.  Left hip wound is completely benign show no erythema no drainage no swelling.  He has no motor or sensory deficit.    Assessment:        1. Left hip pain    2. Status post total replacement of left hip    3. S/P hip replacement, left           Plan: He is doing quite well following his total of arthroplasty.  Continue the physical therapy.  When he returns in 4 weeks get an x-ray of the right knee to be evaluated by Dr. Rowell for possible cortisone or gel injections.  He states at that time he wants to discuss scheduling of the right total hip arthroplasty.  Answered all questions            Work Status:    MYA query complete.    Orders:  Orders Placed This Encounter   Procedures    XR Pelvis 1 or 2 View       Medications:  New Medications Ordered This Visit   Medications    oxyCODONE-acetaminophen (PERCOCET) 7.5-325 MG per tablet     Sig: Take 1 tablet by mouth Every 4 (Four) Hours As Needed for Severe Pain.     Dispense:  30 tablet     Refill:  0       Followup:  Return in about 4 weeks (around 3/4/2025).          Dictated utilizing Dragon dictation

## 2025-02-05 ENCOUNTER — TELEPHONE (OUTPATIENT)
Dept: ORTHOPEDIC SURGERY | Facility: CLINIC | Age: 60
End: 2025-02-05
Payer: COMMERCIAL

## 2025-02-05 NOTE — TELEPHONE ENCOUNTER
Caller: NI    Relationship to patient: [facility]    Best call back number:     Patient is needing: JUANITO STATED HE IS CALLING TO HELP THE PATIENT GET A PRIOR AUTH SUBMITTED FOR THE OXYCODONE

## 2025-03-04 ENCOUNTER — OFFICE VISIT (OUTPATIENT)
Dept: ORTHOPEDIC SURGERY | Facility: CLINIC | Age: 60
End: 2025-03-04
Payer: COMMERCIAL

## 2025-03-04 VITALS — HEIGHT: 75 IN | BODY MASS INDEX: 20.14 KG/M2 | WEIGHT: 162 LBS

## 2025-03-04 DIAGNOSIS — M17.31 POST-TRAUMATIC OSTEOARTHRITIS OF RIGHT KNEE: ICD-10-CM

## 2025-03-04 DIAGNOSIS — Z96.642 STATUS POST TOTAL REPLACEMENT OF LEFT HIP: Primary | ICD-10-CM

## 2025-03-04 DIAGNOSIS — M16.11 PRIMARY OSTEOARTHRITIS OF RIGHT HIP: ICD-10-CM

## 2025-03-04 RX ORDER — CHLORHEXIDINE GLUCONATE 500 MG/1
CLOTH TOPICAL ONCE
OUTPATIENT
Start: 2025-03-04

## 2025-03-04 RX ORDER — TRIAMCINOLONE ACETONIDE 40 MG/ML
80 INJECTION, SUSPENSION INTRA-ARTICULAR; INTRAMUSCULAR
Status: COMPLETED | OUTPATIENT
Start: 2025-03-04 | End: 2025-03-04

## 2025-03-04 RX ORDER — PREGABALIN 150 MG/1
150 CAPSULE ORAL ONCE
OUTPATIENT
Start: 2025-03-04 | End: 2025-03-04

## 2025-03-04 RX ORDER — LIDOCAINE HYDROCHLORIDE 10 MG/ML
4 INJECTION, SOLUTION EPIDURAL; INFILTRATION; INTRACAUDAL; PERINEURAL
Status: COMPLETED | OUTPATIENT
Start: 2025-03-04 | End: 2025-03-04

## 2025-03-04 RX ADMIN — LIDOCAINE HYDROCHLORIDE 4 ML: 10 INJECTION, SOLUTION EPIDURAL; INFILTRATION; INTRACAUDAL; PERINEURAL at 13:48

## 2025-03-04 RX ADMIN — TRIAMCINOLONE ACETONIDE 80 MG: 40 INJECTION, SUSPENSION INTRA-ARTICULAR; INTRAMUSCULAR at 13:48

## 2025-03-04 NOTE — PROGRESS NOTES
Subjective:     Patient ID: Ernst Clark is a 59 y.o. male.    Chief Complaint:    History of Present Illness  Ernst Clark returns to clinic today for evaluation of right hip pain.  The patient states that the hip pain is now so severe that he can barely ambulate and his hips are catching locking clicking and popping.  He states that the pain is excruciating he is not able to sleep and narcotics are no longer controlling the pain.  The patient can barely walk today.  He has audible clicking and grinding when walking down the hallway.  The patient underwent left total hip arthroplasty 6 weeks ago and is doing quite well.  He denies fever chills or drainage from surgical incision.  He is happy with the result. He states one of his big limiting factors now is his right knee.  He had a history of right knee arthroscopic surgery about 20 years ago by Dr. Cummings.  He states pain is located anterior medially in the knee and is worse with activity and better with rest.     Social History     Occupational History    Not on file   Tobacco Use    Smoking status: Some Days     Types: Cigarettes     Passive exposure: Current    Smokeless tobacco: Current     Types: Snuff    Tobacco comments:     quit smoking 30 years ago     Occasional smokes and dips   Vaping Use    Vaping status: Never Used   Substance and Sexual Activity    Alcohol use: Not Currently    Drug use: Never    Sexual activity: Defer      Past Medical History:   Diagnosis Date    Arthritis     Elevated cholesterol     Hip pain, bilateral     Hypertension     Left shoulder pain      Past Surgical History:   Procedure Laterality Date    KNEE CARTILAGE SURGERY Right     pt states over 20 years ago    TOTAL HIP ARTHROPLASTY Left 1/22/2025    Procedure: TOTAL HIP ARTHROPLASTY ANTERIOR WITH HANA TABLE;  Surgeon: Gilberto Rowell MD;  Location: [facility];  Service: Orthopedics;  Laterality: Left;       Family History   Problem Relation Age of Onset    Nicole  "Hyperthermia Neg Hx                  Objective:  Vitals:    25 1323   Weight: 73.5 kg (162 lb)   Height: 190.5 cm (75\")           25  1323   Weight: 73.5 kg (162 lb)       Body mass index is 20.25 kg/m².  BMI is within normal parameters. No other follow-up for BMI required.        Right Knee Exam     Tenderness   The patient is experiencing tenderness in the medial retinaculum and medial joint line.    Range of Motion   Extension:  5   Flexion:  120     Tests   Varus: negative   Lachman:  Anterior - negative    Posterior - negative  Drawer:  Anterior - negative    Posterior - negative    Other   Erythema: absent  Scars: present  Sensation: normal  Pulse: present  Swelling: mild  Effusion: no effusion present      Right Hip Exam     Tenderness   The patient is experiencing tenderness in the anterior and lateral.    Range of Motion   Flexion:  abnormal   External rotation:  abnormal   Internal rotation:  abnormal     Muscle Strength   Flexion: 3/5     Tests   JENNI: positive  Fadir:  Positive FADIR test    Other   Erythema: absent  Scars: absent  Sensation: normal  Pulse: present      Left Hip Exam     Tenderness   The patient is experiencing no tenderness.     Range of Motion   Flexion:  abnormal   External rotation:  abnormal   Internal rotation: abnormal     Muscle Strength   Flexion: 5/5     Tests   JENNI: negative  Fadir:  Negative FADIR test    Other   Erythema: absent  Scars: present  Sensation: normal  Pulse: present               Imagin views of the right knee were ordered and reviewed by myself in the office today  Indication: right knee pain  Findings: X-rays demonstrate no acute osseous abnormality.  There is no signs of fracture dislocation or subluxation.  There is  severe  Kellgren and Yonny grade 4joint space narrowing, subchondral sclerosis, cystic changes, and periarticular osteophytes most pronounced in the Medial joint.  Comparative studies: None      Imaging:     Imaging: 3 " views of the right hip and pelvis were reviewed by myself in the office today  Indication: right hip pain  Findings: X-rays demonstrate no acute osseous abnormality.  There is no signs of fracture dislocation or subluxation.  There is loss of joint space narrowing, subchondral sclerosis, cystic changes, and periarticular osteophytes.  There is femoral head flattening and severe joint destruction.  Comparative studies: None      Assessment:        1. Status post total replacement of left hip    2. Primary osteoarthritis of right hip    3. Post-traumatic osteoarthritis of right knee             Plan:    - Large Joint Arthrocentesis: R knee on 3/4/2025 1:48 PM  Indications: pain  Details: 22 G needle, anterolateral approach  Medications: 4 mL lidocaine PF 1% 1 %; 80 mg triamcinolone acetonide 40 MG/ML  Outcome: tolerated well, no immediate complications  Procedure, treatment alternatives, risks and benefits explained, specific risks discussed. Consent was given by the patient. Immediately prior to procedure a time out was called to verify the correct patient, procedure, equipment, support staff and site/side marked as required. Patient was prepped and draped in the usual sterile fashion.                 He has failed conservative management of right hip osteoarthritis.  We discussed further conservative treatments including but not limited to physical therapy, intra-articular injections, nonsteroidal anti-inflammatories, topical creams, use of an assistive device, weight loss, and low impact exercises.  Hevoiced understanding of further nonoperative treatment options but He is interested in proceeding with hip replacement surgery.    The spectrum of treatment options were discussed with the patient in detail including both the nonoperative and operative treatment modalities and their respective risks and benefits.  After thorough discussion, the patient has elected to undergo surgical treatment.  The details of the  surgical procedure were explained including the location of probable incisions and a description of the likely implants to be used.  Models and diagrams were used as educational resources. The patient understands the likely convalescence after surgery, as well as the rehabilitation required.  We thoroughly discussed the risks, benefits, and alternatives to surgery.  The risks include but are not limited to the risk of infection, joint stiffness, blood clots (including DVT and/or pulmonary embolus along with the risk of death), neurologic and/or vascular injury, fracture, dislocation, nonunion, malunion, need for further surgery including hardware failure requiring revision, and continued pain.  It was explained that if tissue has been repaired or reconstructed, there is also a chance of failure which may require further management.  Following the completion of the discussion, the patient expressed understanding of this planned course of care, all their questions were answered and consent will be obtained preoperatively.    Plan for right total hip arthroplasty, anterior approach.    Implants: Covarrubias and nephSpeed Dating by [employer] catalyst Press Fit   Anticoagulation: Asprin  Antibiotics: Cefazolin and vanc (+MRSA nares)  Admission Type: Same Day  TXA: Yes  Post op abx: YES    I discussed with the patient that they are doing well from my standpoint.  The patient is ambulatory today with right-sided.  I discussed with them that it is important to continue working on strengthening and range of motion exercises.  They should continue to attend physical therapy until they are discharged by the therapist or until they feel like they are no longer making improvements.  I discussed with them that it is normal to have stiffness achiness and pain particularly at night and in the morning.  This will continue to improve as time goes on and may continue to improve for 6 to 12 months postoperatively. I offered the patient a narcotic refill.  The  patient's surgical incision is healed without signs of infection.  It is now okay for the patient to soak or submerge the surgical incision underwater.  They should clean the incision daily with soapy water in the shower.  I would like the patient to notify our office immediately if they note any increasing redness, pain, fevers, chills, or drainage of any kind from their surgical incision as this would be abnormal at this point in time.  I offered the patient a narcotic refill.  They may discontinue the anticoagulant from my standpoint unless prescribed by another provider prior to surgery.  I would like to see the patient back in 6 weeks for 12-week follow-up appointment.  The patient voiced understanding and agreement with the plan.     As for his right knee he has developed posttraumatic osteoarthritis.I discussed with the patient that the mainstays of conservative treatment for right knee OA include physical therapy, nonsteroidal anti-inflammatories, injections, activity modification, and home exercises.  The patient states that they  would like to try intra-articular right knee corticosteroid injection.         Ernst Clark was in agreement with plan and had all questions answered.     Medications:  No orders of the defined types were placed in this encounter.      Followup:  No follow-ups on file.    Diagnoses and all orders for this visit:    1. Status post total replacement of left hip (Primary)    2. Primary osteoarthritis of right hip  -     XR Knee 4+ View Right  -     Case Request; Standing  -     CBC and Differential; Future  -     Basic metabolic panel; Future  -     MRSA Screen Culture (Outpatient) - Swab, Nares; Future  -     Hemoglobin A1c; Future  -     Type and screen; Future  -     Urinalysis With Culture If Indicated -; Future  -     lactated ringers bolus 500 mL  -     Tranexamic Acid 1,000 mg in sodium chloride 0.9 % 100 mL  -     Tranexamic Acid 1,000 mg in sodium chloride 0.9 % 100  mL  -     Chlorhexidine Gluconate Cloth 2 % pads  -     ethyl alcohol 62 % 2 each  -     ceFAZolin (ANCEF) 2 g in sodium chloride 0.9 % 100 mL IVPB  -     pregabalin (LYRICA) capsule 150 mg  -     vancomycin (VANCOCIN) 1,000 mg in sodium chloride 0.9 % 250 mL IVPB  -     Case Request    3. Post-traumatic osteoarthritis of right knee    Other orders  -     Follow Anesthesia Guidelines / Protocol; Future  -     Follow Anesthesia Guidelines / Protocol; Standing  -     Verify NPO Status; Standing  -     SCD (sequential compression device)- to be placed on patient in Pre-op; Standing  -     Clip operative site; Standing  -     Care Order / Instructions  -     Type & Screen; Standing  -     - Large Joint Arthrocentesis: R knee            Dictated utilizing Dragon dictation

## 2025-03-06 ENCOUNTER — PRE-ADMISSION TESTING (OUTPATIENT)
Dept: PREADMISSION TESTING | Facility: HOSPITAL | Age: 60
End: 2025-03-06
Payer: COMMERCIAL

## 2025-03-06 VITALS
RESPIRATION RATE: 16 BRPM | WEIGHT: 165 LBS | HEART RATE: 91 BPM | SYSTOLIC BLOOD PRESSURE: 155 MMHG | OXYGEN SATURATION: 93 % | BODY MASS INDEX: 20.51 KG/M2 | HEIGHT: 75 IN | DIASTOLIC BLOOD PRESSURE: 88 MMHG

## 2025-03-06 DIAGNOSIS — M16.11 PRIMARY OSTEOARTHRITIS OF RIGHT HIP: ICD-10-CM

## 2025-03-06 LAB
ABO GROUP BLD: NORMAL
ANION GAP SERPL CALCULATED.3IONS-SCNC: 11.5 MMOL/L (ref 5–15)
BACTERIA UR QL AUTO: NORMAL /HPF
BASOPHILS # BLD AUTO: 0.01 10*3/MM3 (ref 0–0.2)
BASOPHILS NFR BLD AUTO: 0.1 % (ref 0–1.5)
BILIRUB UR QL STRIP: NEGATIVE
BLD GP AB SCN SERPL QL: NEGATIVE
BUN SERPL-MCNC: 23 MG/DL (ref 8–23)
BUN/CREAT SERPL: 26.1 (ref 7–25)
CALCIUM SPEC-SCNC: 9.2 MG/DL (ref 8.6–10.5)
CHLORIDE SERPL-SCNC: 102 MMOL/L (ref 98–107)
CLARITY UR: CLEAR
CO2 SERPL-SCNC: 23.5 MMOL/L (ref 22–29)
COLOR UR: YELLOW
CREAT SERPL-MCNC: 0.88 MG/DL (ref 0.76–1.27)
DEPRECATED RDW RBC AUTO: 43.7 FL (ref 37–54)
EGFRCR SERPLBLD CKD-EPI 2021: 98.4 ML/MIN/1.73
EOSINOPHIL # BLD AUTO: 0 10*3/MM3 (ref 0–0.4)
EOSINOPHIL NFR BLD AUTO: 0 % (ref 0.3–6.2)
ERYTHROCYTE [DISTWIDTH] IN BLOOD BY AUTOMATED COUNT: 12.5 % (ref 12.3–15.4)
GLUCOSE SERPL-MCNC: 221 MG/DL (ref 65–99)
GLUCOSE UR STRIP-MCNC: ABNORMAL MG/DL
HBA1C MFR BLD: 6.52 % (ref 4.8–5.6)
HCT VFR BLD AUTO: 42.7 % (ref 37.5–51)
HGB BLD-MCNC: 13.9 G/DL (ref 13–17.7)
HGB UR QL STRIP.AUTO: NEGATIVE
HYALINE CASTS UR QL AUTO: NORMAL /LPF
IMM GRANULOCYTES # BLD AUTO: 0.1 10*3/MM3 (ref 0–0.05)
IMM GRANULOCYTES NFR BLD AUTO: 0.8 % (ref 0–0.5)
KETONES UR QL STRIP: NEGATIVE
LEUKOCYTE ESTERASE UR QL STRIP.AUTO: NEGATIVE
LYMPHOCYTES # BLD AUTO: 0.86 10*3/MM3 (ref 0.7–3.1)
LYMPHOCYTES NFR BLD AUTO: 6.6 % (ref 19.6–45.3)
MCH RBC QN AUTO: 30.6 PG (ref 26.6–33)
MCHC RBC AUTO-ENTMCNC: 32.6 G/DL (ref 31.5–35.7)
MCV RBC AUTO: 94.1 FL (ref 79–97)
MONOCYTES # BLD AUTO: 0.71 10*3/MM3 (ref 0.1–0.9)
MONOCYTES NFR BLD AUTO: 5.5 % (ref 5–12)
NEUTROPHILS NFR BLD AUTO: 11.27 10*3/MM3 (ref 1.7–7)
NEUTROPHILS NFR BLD AUTO: 87 % (ref 42.7–76)
NITRITE UR QL STRIP: NEGATIVE
PH UR STRIP.AUTO: 6.5 [PH] (ref 4.5–8)
PLATELET # BLD AUTO: 492 10*3/MM3 (ref 140–450)
PMV BLD AUTO: 8.9 FL (ref 6–12)
POTASSIUM SERPL-SCNC: 4.7 MMOL/L (ref 3.5–5.2)
PROT UR QL STRIP: ABNORMAL
RBC # BLD AUTO: 4.54 10*6/MM3 (ref 4.14–5.8)
RBC # UR STRIP: NORMAL /HPF
REF LAB TEST METHOD: NORMAL
RH BLD: POSITIVE
SODIUM SERPL-SCNC: 137 MMOL/L (ref 136–145)
SP GR UR STRIP: 1.02 (ref 1–1.03)
SQUAMOUS #/AREA URNS HPF: NORMAL /HPF
T&S EXPIRATION DATE: NORMAL
UROBILINOGEN UR QL STRIP: ABNORMAL
WBC # UR STRIP: NORMAL /HPF
WBC NRBC COR # BLD AUTO: 12.95 10*3/MM3 (ref 3.4–10.8)

## 2025-03-06 PROCEDURE — 86850 RBC ANTIBODY SCREEN: CPT

## 2025-03-06 PROCEDURE — 36415 COLL VENOUS BLD VENIPUNCTURE: CPT

## 2025-03-06 PROCEDURE — 86901 BLOOD TYPING SEROLOGIC RH(D): CPT

## 2025-03-06 PROCEDURE — 85025 COMPLETE CBC W/AUTO DIFF WBC: CPT

## 2025-03-06 PROCEDURE — 80048 BASIC METABOLIC PNL TOTAL CA: CPT

## 2025-03-06 PROCEDURE — 87081 CULTURE SCREEN ONLY: CPT

## 2025-03-06 PROCEDURE — 81001 URINALYSIS AUTO W/SCOPE: CPT

## 2025-03-06 PROCEDURE — 86900 BLOOD TYPING SEROLOGIC ABO: CPT

## 2025-03-06 PROCEDURE — 83036 HEMOGLOBIN GLYCOSYLATED A1C: CPT

## 2025-03-06 NOTE — PAT
Pt here for PAT visit.  Pre-op tests completed, chg soap given, and instructions reviewed.  Instructed clears until 2 hrs prior to arrival time, voiced understanding. HARIKA dressing handout given and reviewed

## 2025-03-06 NOTE — DISCHARGE INSTRUCTIONS
PRE-ADMISSION TESTING INSTRUCTIONS FOR TOTAL JOINT PATIENTS    Take these medications the morning of surgery with a small sip of water: Amlodopine      No aspirin, advil, aleve, ibuprofen, naproxen, diet pills, decongestants, or vitamin/herbal supplements for a week prior to your surgery.     Tylenol/Acetaminophen ok to take if needed.    Do not take any insulin or diabetes medications the morning of surgery.    Your surgeon may give you Bactroban to use prior to surgery. This will be started 5 days prior to surgery, follow the directions on your prescription from your surgeon for use.      General Instructions:    DO NOT EAT SOLID FOOD AFTER MIDNIGHT THE NIGHT BEFORE SURGERY. No gum, mints, or hard candy after midnight the night before surgery.  You may drink clear liquids the day of surgery up until 2 hours before your arrival time.  Clear liquids are liquids you can see through. Nothing RED in color.    Plain water    Sports drinks      Gelatin (Jell-O)  Fruit juices without pulp such as white grape juice and apple juice  Popsicles that contain no fruit or yogurt  Tea or coffee (no cream or milk added)    It is beneficial for you to have a clear drink that contains carbohydrates 2 hours prior to your arrival time.  DRINK A BOTTLE OF SPORTS DRINK 2 HOURS BEFORE YOUR ARRIVAL TIME. IF YOU ARE DIABETIC, DRINK A LOW OR NO SUGAR SPORTS DRINK. ANY COLOR EXCEPT RED.    Patients who avoid smoking, chewing tobacco and alcohol for 4 weeks prior to surgery have a reduced risk of post-operative complications.  If at all possible, quit smoking as many days before surgery as you can.    Do not smoke, use chewing tobacco or drink alcohol after midnight the day of surgery.    Bring your C-PAP/ BI-PAP machine if you use one.  Wear clean comfortable clothes.  Do not wear contact lenses, lotion, deodorant, or make-up.  Bring a case for your glasses if applicable.   You may brush your teeth the morning of surgery.  You may wear  dentures/partials, do not put adhesive/glue on them.  Leave all other jewelry and valuables at home.  NOTIFY YOUR SURGEON IF YOU BECOME ILL, HAVE A FEVER, PRODUCTIVE COUGH, OR CANNOT BE HERE THE DAY OF SURGERY.      Preventing a Surgical Site Infection:    Shower the night before and on the morning of surgery using the chlorhexidine soap you were given.  Use a clean washcloth with the soap.  Place clean sheets on your bed after showering the night before surgery. Do not use the CHG soap on your hair, face, or private areas. Wash your body gently for five (5) minutes. Do not scrub your skin.  Dry with a clean towel and dress in clean clothing.    Do not shave the surgical area for 10 days-2 weeks prior to surgery  because the razor can irritate skin and make it easier to develop an infection.  Make sure you, your family, and all healthcare providers clean their hands with soap and water or an alcohol based hand  before caring for you or your wound.      Day of surgery:    Your surgeon’s office will advise you of your arrival time for the day of surgery.    Upon arrival, a Pre-op nurse and Anesthesia provider will review your health history, obtain vital signs, and answer questions you may have. The anesthesia provider will also discuss the type of anesthesia that will be needed for your procedure, which may include general anesthesia. The only belongings needed at this time will be your home medications and if applicable your C-PAP/BI-PAP machine.  If you are staying overnight your family can leave the rest of your belongings in the car and bring them to your room later.  A Pre-op nurse will start an IV and you may receive medication in preparation for surgery, including something to help you relax.  Your family will be able to see you in the Pre-op area.  While you are in surgery your family should notify the waiting room  if they leave the waiting room area and provide a contact phone  number.    If you have any questions, you can call the Pre-Admission Department at [phone] or your surgeon's office.    Please be aware that surgery does come with discomfort.  We want to make every effort to control your discomfort so please discuss any uncontrolled symptoms with your nurse.   Your doctor will most likely have prescribed pain medications.     You may have bruising or discomfort from the tourniquet used in surgery.     Please leave all luggage in the car the morning of surgery.  You will be transported to your hospital room following the recovery period.  Your family can get your luggage at that time.      You may receive a survey regarding the care you received. Your feedback is very important and will be used to collect the necessary data to help us to continue to provide excellent care.

## 2025-03-07 LAB — MRSA SPEC QL CULT: ABNORMAL

## 2025-03-07 NOTE — DISCHARGE INSTR - APPOINTMENTS
You have a physical therapy appointment at [facility] on 3/21/25 at 9:45am. If you have any questions regarding this appointment you can reach them at [phone].    [facility]  [address] [place] [id number]

## 2025-03-07 NOTE — CASE MANAGEMENT/SOCIAL WORK
Continued Stay Note  LADAN Stacy     Patient Name: Ernst Clark  MRN: [id number]  Today's Date: 3/7/2025    Admit Date: (Not on file)        Discharge Plan       Row Name 03/07/25 1112       Plan    Plan Comments CM spoke with patient today to arrange any needed equipment and discuss physical therapy for his surgery with Dr. Rowell on 3/19/25. Patient states he has a rolling walker and BSC already at home from his previous surgery and does not anticipate needing any other equipment at discharge. He states he would like to do OP PT at [facility] and is agreeable for CM to arrange this service. He had no other questions. JOSÉ MIGUEL spoke with Oneyda at [facility] and she has scheduled an appointment for 3/21/25 @ 9:45am. CM faxed face sheet to Oneyda at [phone]. CM will follow.                   Discharge Codes    No documentation.                       Mya Del Cid RN

## 2025-03-17 PROCEDURE — S0260 H&P FOR SURGERY: HCPCS | Performed by: INTERNAL MEDICINE

## 2025-03-17 NOTE — H&P
[facility]   HISTORY AND PHYSICAL    Patient Name:Ernst Clark  : 1965  MRN: [id number]  Primary Care Physician: Дмитрий Villasenor MD  Date of admission: (Not on file)    Subjective   Subjective     Chief Complaint: right hip pain/OA    History of Present Illness   Ernst Clark is a 59 y.o. male who presents to the hospital today for right hip pain and osteoarthritis.  Patient states the hip pain is located anteriorly in the groin and is worse with activity and better with rest.  The patient has exhausted conservative treatments.  The patient states that their quality of life is suffered significantly.  The patient was seen by me in the office at an earlier date and was scheduled for right total hip arthroplasty.  The patient presents to the hospital today for that procedure.       Review of Systems   Musculoskeletal:  Positive for arthralgias, gait problem, joint swelling and myalgias.   All other systems reviewed and are negative.        Personal History     Past Medical History:   Diagnosis Date    Arthritis     Elevated cholesterol     Hip pain, bilateral     Hypertension     Left shoulder pain        Past Surgical History:   Procedure Laterality Date    KNEE CARTILAGE SURGERY Right     pt states over 20 years ago    TOTAL HIP ARTHROPLASTY Left 2025    Procedure: TOTAL HIP ARTHROPLASTY ANTERIOR WITH HANA TABLE;  Surgeon: Gilberto Rowell MD;  Location: [facility];  Service: Orthopedics;  Laterality: Left;       Family History: His family history is not on file.     Social History: He  reports that he has quit smoking. His smoking use included cigarettes. He has been exposed to tobacco smoke. He has quit using smokeless tobacco.  His smokeless tobacco use included snuff. He reports that he does not currently use alcohol. He reports that he does not use drugs.    Home Medications:  amLODIPine, aspirin, docusate sodium, gabapentin, losartan-hydrochlorothiazide, meloxicam,  naloxone, ondansetron, oxyCODONE-acetaminophen, rosuvastatin, and sennosides-docusate    Allergies:  He has no known allergies.    Objective    Objective     Vitals:         Physical Exam   Right Hip Exam      Tenderness   The patient is experiencing tenderness in the anterior and lateral.     Range of Motion   Flexion:  abnormal   External rotation:  abnormal   Internal rotation:  abnormal      Muscle Strength   Flexion: 3/5      Tests   JENNI: positive  Fadir:  Positive FADIR test     Other   Erythema: absent  Scars: absent  Sensation: normal  Pulse: present    Result Review    Result Review:  I have personally reviewed the results from the time of this admission to 3/17/2025 11:17 EDT and agree with these findings:  []  Laboratory list / accordion  []  Microbiology  []  Radiology  []  EKG/Telemetry   []  Cardiology/Vascular   []  Pathology  []  Old records  []  Other:  Most notable findings include:   Imaging: 3 views of the right hip and pelvis   Indication: right hip pain  Findings: X-rays demonstrate no acute osseous abnormality.  There is no signs of fracture dislocation or subluxation.  There is loss of joint space narrowing, subchondral sclerosis, cystic changes, and periarticular osteophytes.  There is femoral head flattening and severe joint destruction.  Comparative studies: None      Assessment & Plan   Assessment / Plan     Brief Patient Summary:  Ernst Clark is a 59 y.o. male with severe right hip OA    Active Hospital Problems:  Active Hospital Problems    Diagnosis     **Primary osteoarthritis of right hip      Plan:   Cc: f/u right hip pain, DJD    Patient presented to the [facility] today for scheduled right total hip arthroplasty.    I reviewed anatomy and a model of a total hip arthroplasty, as well as typical postoperative recovery of 6-12 months before maximal recovery, and possible need for rehabilitation stay after hospitalization. We also discussed risks, benefits, and alternatives of  procedure with risks including but not limited to neurovascular damage, bleeding, infection, malalignment, chronic pain, leg length discrepancy, failure of implants, osteolysis, loosening of implants, loss of motion, weakness, stiffness, instability, DVT, pulmonary embolus, death, stroke, complex regional pain syndrome, myocardial infarction, and need for additional procedures. Patient understood all these and had all questions answered before consenting to the procedure. No guarantees were given in regards to results from the surgery.  Patient has been medically optimize by his primary care physician.    Implants: Covarrubias and nephStar Fever Agency Polar Press Fit   Anticoagulation: Asprin  Antibiotics: Cefazolin and Vanc  Post op ABX: Yes  TXA: Yes  Admission Type: Same Day    All remaining questions answered today.     VTE Prophylaxis:  No VTE prophylaxis order currently exists.        Gilberto Rowell MD

## 2025-03-17 NOTE — OP NOTE
OPERATIVE REPORT    Date of Surgery:   03/19/25    Attending Surgeon:   Gilberto Rowell MD, MSE    ASSISTANTS:    Assistant: (Noah Mariano CSA) was responsible for performing the following activities: Retraction, Suction, Suturing, Closing, and Placing Dressing and their skilled assistance was necessary for the success of this case.     ANESTHESIA:   Spinal    PREOPERATIVE DIAGNOSIS:   Right hip osteoarthritis    POSTOPERATIVE DIAGNOSIS:   Same as above     PROCEDURE:   1. Right primary total hip arthroplasty    SURGICAL APPROACH:        IMPLANTS:   1.Covarrubias and Nephew R3 Acetabular Shell, 60 mm OD  2. Smith and Nephew 6.5 mm acetabular screws, 2   3. Smith and Nephew Dual Mobility  46  mm ID, neutral  4. Smith and Nephew Polar standard offset, size 4  5. Femoral Head, Size 28, Neck length 0  6. Dual mobility outer ball 46 mm OD, 28 mm ID    CLOSURE:  #5 Ethibond  #1 PDS STRATAFIX  2-0 Monocryl STRATAFIX  3-0 Monocryl STRATAFIX  Exofin glue    INDICATIONS:   This patient has a history of Right end stage hip osteoarthritis. The patient has failed conservative treatment. He is therefore indicated for total hip arthroplasty.  Risks of surgery, including pain, bleeding, infection, scar, injury to nearby neurovascular structures, fracture, venous thromboembolism, limb length discrepancy, dislocation, need for more procedures, implant failure, implant loosening, and implant subsidence were discussed preoperatively.  The risks of anesthesia including heart attack, stroke, blood clots and even death were discussed. The benefits of surgery including pain relief and restoration of function were discussed. Alternatives to surgery, including nonoperative care were also discussed and the patient elects to proceed with CHERYL.    OPERATIVE FINDINGS:  Right full thickness cartilage loss on the femoral head and acetabulum and osteophytes    DESCRIPTION OF PROCEDURE:   The patient was met in the preoperative holding area, evaluated,  consent was obtained, and the Right hip was marked.  The patient was brought to the operating room and placed on the operating room table in the supine position.  After anesthesia was established, the patient was prepped and draped in the usual sterile fashion. All bony prominences and peripheral nerves were padded.  A surgical time out was taken to confirm the operative side and details of the operative procedure.  The patient was given prophylactic antibiotics prior to skin incision.      We utilized an anterior oblique incision and the Covarrubias-Leslie interval to access the hip. An oblique anterior incision was made starting 2 centimeters lateral and distal to the anterior-superior iliac spine and extending distal and slight lateral in line with the tensor fascia adrian.  The position of the incision was confirmed with fluoroscopy prior to incision. This incision was carried through subcutaneous tissue to the underlying fascia of the tensor fascia adrian muscle.  The fascia was incised.  The tensor fascia muscle belly was reflected superolaterally with a curved cobra retractor over the femoral neck.  The rectus was reflected medially with a curved Cobra retractor, inferior to the femoral neck.  A third retractor was carefully placed under the rectus onto the anterior hip capsule directly on bone.    The anterior hip capsule was exposed.  The reflected head of the rectus tendon was exposed.  The capsulotomy was performed along the axis of the superior femoral neck.  A superior flap was raised off the greater trochanter and off the acetabular rim and reflected superiorly underneath our curved Cobra retractor.  An inferior flap was created by peeling the capsule off the intertrochanteric ridge inferomedially to the top of the lesser trochanter, and again reflected anteriorly with our curved Cobra retractor.  The capsular flaps were tagged. The femoral neck cut was then made.  This was based off our preoperative plan  using anatomic landmarks and confirmed with fluoroscopic guidance.  The femoral neck cut was made in two places.  The central segment of the neck was removed separately, and then the femoral head was removed.      The acetabulum was exposed.  A bent Hohmann retractor was placed anterior-inferior between the capsule and the labrum.  A Cobra retractor was placed posteriorly between the capsule and the labrum. The inferior capsule was incised. The acetabular labrum was excised.  Overhanging soft tissue was excised to provide excellent exposure of the acetabulum. The acetabulum was then sequentially reamed under direct visualization and fluoroscopic guidance with increased size reamers until our final reamer, which gave a 1 millimeter press-fit.  The acetabulum was then placed and impacted into position.  The acetabular component placement goal was approximately 42.5 degrees of abduction, with 15 degrees of anteversion.  We used our preoperative planning, bony landmarks and fluoroscopy to assess position.  The acetabular component was impacted into the pelvis and then secured to the pelvis with 2 screws.  The acetabular liner was placed, impacted into position; it locked.  The wound was then irrigated.     We then turned to the proximal femur.  The retractors were removed, and a new set of retractors was placed.  The OmniTract femoral elevator hook was placed around the vastus lateralis and proximal femur between the vastus tubercle and gluteus jessenia tendon. The hip was hyperextended by putting the bed in Trendelenberg and lowering the foot of the bed. The hook was assembled to its mounting frame and a gentle amount of tension was placed on the hook.    The double pronged retractor was placed on the posteromedial proximal femur and the double toothed retractor was placed in the interval between the capsule and gluteus minimus. Femoral releases of the capsule, piriformis and conjoint tendon were sequentially performed  as needed until adequate exposure was obtained.  The femur was then extended, adducted, and externally rotated.  The proximal femur was mobilized into the wound to create adequate exposure of the proximal femur.  After exposure was obtained, the femur was prepared with a box osteotome, curved awl, and then broached up to the final broach size. The hook was de-tensioned and the hook and retractors were then removed.    AP and lateral fluoroscopy of the femur were obtained to confirm good fit and alignment of the stem, as well as no fractures or cortical perforations were present. The trial neck and head were then placed, and the hip was reduced. Fluoroscopy was used to confirm length and offset were correct. Leg lengths were assessed clinically on the table.  The hip was assessed in extension, external rotation to check anterior stability.  It was stable.  Posterior stability was assessed with flexion, adduction, internal rotation. The hip was stable.  We flexed to 90 with internal rotation, stable.  Full flexion stable.  The hip was then dislocated.  The femur was exposed with the hook and retractors. The trial implants were removed.  The final hip implants were placed, impacted into position.  The stem had a good fit and fixation.  The femoral head was placed.  The hip was reduced.  With the final implants in position, we trialed the hip again.  Fluoroscopic images were again obtained, including AP and lateral views, which demonstrated excellent component position and no intraoperative fractures. The leg lengths, anterior stability, and posterior stability were all excellent.  The hip was then copiously irrigated with dilute Betadine and normal saline using a pulsatile lavage.  The hip capsular flaps were closed with a #5 Ethibond. The tensor fascia was closed with #1 PDS STRATAFIX.  The hip capsular flaps as well as fascia and subcutaneous tissue were injected with a mixture consisting of 30 mL of 0.5%  ropivacaine, 30 mg of ketorolac, and epinephrine 0.2 mg solution diluted in 30 mL of normal saline.  Subcutaneous tissue closed with 2-0 Monocryl STRATAFIX.  The skin was closed with 3-0 Monocryl, STRATAFIX, and exofin glue and a sterile PICIO dressing was placed.    The patient tolerated the procedure well and was taken to the recovery room in stable condition.  Following completion of the surgery all sponge instrument needle counts were correc    POSTOPERATIVE PLAN:   1. Weightbearing as tolerated on operative extremity  2. Anterior hip precautions x 4 weeks  3. DVT prophylaxis    ESTIMATED BLOOD LOSS:   200 ccs    INTRAOPERATIVE FLUIDS:   Per anesthesia record    SPONGE/INSTRUMENT/NEEDLE COUNTS:   Correct x 2    CONDITION ON DISCHARGE:   Stable    COMPLICATIONS:   none    Gilberto Rowell MD, MSE

## 2025-03-17 NOTE — DISCHARGE INSTRUCTIONS
Total Hip Replacement Discharge Instructions:    I. ACTIVITIES:  1. Exercises:  Complete exercise program as taught by the hospital physical therapist 2 times per day  Exercise program will be advanced by the physical therapist  During the day be up ambulating every 2 hours (while awake) for short distances  Complete the ankle pump exercises at least 10 times per hour (while awake)  Elevate legs when in bed and for at least 30 minutes during the day.Use cold packs 20-30 minutes approximately 5 times per day. This should be done before and after completing your exercises and at any time you are experiencing pain/ stiffness in your operative extremity.      2. Activities of Daily Living:  No tub baths, hot tubs, or swimming pools for 4 weeks  May shower and let water run over the incision on post-operative day #5 if no drainage. Do not scrub or rub the incision. Simply let the water run over the incision and pat dry.    II. Restrictions  Continue hip precautions as taught at the hospital  Your surgeon will discuss with you when you will be able to drive again.  Weight bearing is as tolerated  First week stay inside on even terrain. May go up and down stairs one stair at a time utilizing the hand rail once cleared by physical therapy to do so.  After one week, you may venture outside (if cleared to do so by physical therapist).    III. Precautions:  Everyone that comes near you should wash their hands  No elective dental, genital-urinary, or colon procedures or surgical procedures for 12 weeks after surgery unless absolutely necessary.   If dental work or surgical procedure is deemed absolutely necessary, you will need to contact your surgeon as you will need to take antibiotics 1 hour prior to any dental work (including teeth cleanings).  Please discuss with your surgeon prophylactic antibiotics as the length of time this intervention will be necessary for you varies with each patient’s health history and  situation.  Avoid sick people. If you must be around someone who is ill, they should wear a mask.  Avoid visits to the Emergency Room or Urgent Care unless you are having a life threatening event.   If ordered stockings are to be placed on in the morning and removed at night. Monitor the stockings to ensure that any swelling is not causing the stockings to become too tight. In this case, remove stockings immediately.    IV. INCISION CARE:  The dressing placed during surgery is waterproof and should remain in place for 5 to 7 days.  May shower with waterproof dressing on and allowed water to run over the dressing  Remove the dressing around day 5-7  There is Steri-Strips underneath your dressing which may stay on and will eventually fall off on their own  After dressing removal on day 7 May shower and allow water to run over the incision  No soaking or submersion of the incision in tubs pools hot tubs etc.  No creams or ointments to the incision  Do not touch or pick at the incision  Check incision every day and notify surgeon immediately if any of the following signs or symptoms are noted:  Increase in redness  Increase in swelling around the incision and of the entire extremity  Increase in pain  Drainage oozing from the incision  Pulling apart of the edges of the incision  Increase in overall body temperature (greater than 100.5 degrees)  Your surgeon will instruct you regarding suture or staple removal    V. Medications:   1. Anticoagulants: You will be discharged on an anticoagulant. This is a prophylactic medication that helps prevent blood clots during your post-operative period. The type and length of dosage varies based on your individual needs, procedure performed, and surgeon’s preference.  While taking the anticoagulant, you should avoid taking any additional aspirin, ibuprofen (Advil or Motrin), Aleve (Naprosyn) or other non-steroidal anti-inflammatory medications.   Notify surgeon immediately if any  carlo bleeding is noted in the urine, stool, emesis, or from the nose or the incision. Blood in the stool will often appear as black rather than red. Blood in urine may appear as pink. Blood in emesis may appear as brown/black like coffee grounds.  You will need to apply pressure for longer periods of time to any cuts or abrasions to stop bleeding  Avoid alcohol while taking anticoagulants    2. Stool Softeners: You will be at greater risk of constipation after surgery due to being less mobile and the pain medications.   Take stool softeners as instructed by your surgeon while on pain medications. Over the counter Colace 100 mg 1-2 capsules twice daily.   If stools become too loose or too frequent, please decreases the dosage or stop the stool softener.  If constipation occurs despite use of stool softeners, you are to continue the stool softeners and add a laxative (Milk of Magnesia 1 ounce daily as needed)  Drink plenty of fluids, and eat fruits and vegetables during your recovery time    3. Pain Medications utilized after surgery are narcotics and the law requires that the following information be given to all patients that are prescribed narcotics:  CLASSIFICATION: Pain medications are called Opioids and are narcotics  LEGALITIES: It is illegal to share narcotics with others and to drive within 24 hours of taking narcotics  POTENTIAL SIDE EFFECTS: Potential side effects of opioids include: nausea, vomiting, itching, dizziness, drowsiness, dry mouth, constipation, and difficulty urinating.  POTENTIAL ADVERSE EFFECTS:   Opioid tolerance can develop with use of pain medications and this simply means that it requires more and more of the medication to control pain; however, this is seen more in patients that use opioids for longer periods of time.  Opioid dependence can develop with use of Opioids and this simply means that to stop the medication can cause withdrawal symptoms; however, this is seen with patients  that use Opioids for longer periods of time.  Opioid addiction can develop with use of Opioids and the incidence of this is very unlikely in patients who take the medications as ordered and stop the medications as instructed.  Opioid overdose can be dangerous, but is unlikely when the medication is taken as ordered and stopped when ordered. It is important not to mix opioids with alcohol or with and type of sedative such as Benadryl as this can lead to over sedation and respiratory difficulty.  DOSAGE:   Pain medications will need to be taken consistently for the first week to decrease pain and promote adequate pain relief and participation in physical therapy.  After the initial surgical pain begins to resolve, you may begin to decrease the pain medication. By the end of 6 weeks, you should be off of pain medications.  Refills will not be given by the office during evening hours, on weekends, or after 6 weeks post-op.  To seek refills on pain medications during the initial 6 week post-operative period, you must call the office 48 hours in advance to request the refill. The office will then notify you when to  the prescription. DO NOT wait until you are out of the medication to request a refill.    V. FOLLOW-UP VISITS:  You will need to follow up in the office with your surgeon in  2 weeks. Please call this number [phone]  to schedule this appointment.  If you have any concerns or suspected complications prior to your follow up visit, please call your surgeons office. Do not wait until your appointment time if you suspect complications. These will need to be addressed in the office promptly.

## 2025-03-18 ENCOUNTER — ANESTHESIA EVENT (OUTPATIENT)
Dept: PERIOP | Facility: HOSPITAL | Age: 60
End: 2025-03-18
Payer: COMMERCIAL

## 2025-03-19 ENCOUNTER — APPOINTMENT (OUTPATIENT)
Dept: GENERAL RADIOLOGY | Facility: HOSPITAL | Age: 60
End: 2025-03-19
Payer: COMMERCIAL

## 2025-03-19 ENCOUNTER — HOSPITAL ENCOUNTER (OUTPATIENT)
Facility: HOSPITAL | Age: 60
Setting detail: HOSPITAL OUTPATIENT SURGERY
Discharge: HOME | End: 2025-03-19
Attending: INTERNAL MEDICINE | Admitting: INTERNAL MEDICINE
Payer: COMMERCIAL

## 2025-03-19 ENCOUNTER — ANESTHESIA (OUTPATIENT)
Dept: PERIOP | Facility: HOSPITAL | Age: 60
End: 2025-03-19
Payer: COMMERCIAL

## 2025-03-19 VITALS
DIASTOLIC BLOOD PRESSURE: 110 MMHG | RESPIRATION RATE: 15 BRPM | BODY MASS INDEX: 19.92 KG/M2 | HEART RATE: 79 BPM | OXYGEN SATURATION: 100 % | WEIGHT: 159.4 LBS | SYSTOLIC BLOOD PRESSURE: 169 MMHG | TEMPERATURE: 97.8 F

## 2025-03-19 DIAGNOSIS — Z96.642 S/P HIP REPLACEMENT, LEFT: ICD-10-CM

## 2025-03-19 DIAGNOSIS — M16.11 PRIMARY OSTEOARTHRITIS OF RIGHT HIP: ICD-10-CM

## 2025-03-19 DIAGNOSIS — Z96.641 S/P HIP REPLACEMENT, RIGHT: Primary | ICD-10-CM

## 2025-03-19 PROCEDURE — 73502 X-RAY EXAM HIP UNI 2-3 VIEWS: CPT

## 2025-03-19 PROCEDURE — 25010000002 LIDOCAINE 2% SOLUTION: Performed by: NURSE ANESTHETIST, CERTIFIED REGISTERED

## 2025-03-19 PROCEDURE — 25010000002 MIDAZOLAM PER 1MG: Performed by: NURSE ANESTHETIST, CERTIFIED REGISTERED

## 2025-03-19 PROCEDURE — C1776 JOINT DEVICE (IMPLANTABLE): HCPCS | Performed by: INTERNAL MEDICINE

## 2025-03-19 PROCEDURE — 27130 TOTAL HIP ARTHROPLASTY: CPT | Performed by: INTERNAL MEDICINE

## 2025-03-19 PROCEDURE — 25010000002 ROPIVACAINE PER 1 MG: Performed by: INTERNAL MEDICINE

## 2025-03-19 PROCEDURE — 25010000002 FENTANYL CITRATE (PF) 50 MCG/ML SOLUTION: Performed by: NURSE ANESTHETIST, CERTIFIED REGISTERED

## 2025-03-19 PROCEDURE — 25010000002 ONDANSETRON PER 1 MG: Performed by: NURSE ANESTHETIST, CERTIFIED REGISTERED

## 2025-03-19 PROCEDURE — C1713 ANCHOR/SCREW BN/BN,TIS/BN: HCPCS | Performed by: INTERNAL MEDICINE

## 2025-03-19 PROCEDURE — 25810000003 LACTATED RINGERS PER 1000 ML: Performed by: NURSE ANESTHETIST, CERTIFIED REGISTERED

## 2025-03-19 PROCEDURE — 25810000003 SODIUM CHLORIDE 0.9 % SOLUTION 250 ML FLEX CONT: Performed by: INTERNAL MEDICINE

## 2025-03-19 PROCEDURE — 72170 X-RAY EXAM OF PELVIS: CPT

## 2025-03-19 PROCEDURE — 97161 PT EVAL LOW COMPLEX 20 MIN: CPT

## 2025-03-19 PROCEDURE — 25010000002 PROPOFOL 200 MG/20ML EMULSION: Performed by: NURSE ANESTHETIST, CERTIFIED REGISTERED

## 2025-03-19 PROCEDURE — 25010000002 VANCOMYCIN 1 G RECONSTITUTED SOLUTION 1 EACH VIAL: Performed by: INTERNAL MEDICINE

## 2025-03-19 PROCEDURE — 25010000002 KETOROLAC TROMETHAMINE PER 15 MG: Performed by: INTERNAL MEDICINE

## 2025-03-19 PROCEDURE — 25010000002 EPINEPHRINE 1 MG/ML SOLUTION 1 ML VIAL: Performed by: INTERNAL MEDICINE

## 2025-03-19 PROCEDURE — 25010000002 HYDROMORPHONE 1 MG/ML SOLUTION: Performed by: NURSE ANESTHETIST, CERTIFIED REGISTERED

## 2025-03-19 PROCEDURE — 25010000002 CEFAZOLIN PER 500 MG: Performed by: INTERNAL MEDICINE

## 2025-03-19 PROCEDURE — 25010000002 MEPIVACAINE PF 2 % SOLUTION 20 ML VIAL

## 2025-03-19 PROCEDURE — 25010000002 DEXAMETHASONE PER 1 MG: Performed by: NURSE ANESTHETIST, CERTIFIED REGISTERED

## 2025-03-19 DEVICE — OR3O DUAL MOBILITY XLPE INSERT 28/46
Type: IMPLANTABLE DEVICE | Site: HIP | Status: FUNCTIONAL
Brand: OR3O DUAL MOBILITY

## 2025-03-19 DEVICE — KNOTLESS TISSUE CONTROL DEVICE, UNDYED UNIDIRECTIONAL (ANTIBACTERIAL) SYNTHETIC ABSORBABLE DEVICE
Type: IMPLANTABLE DEVICE | Site: HIP | Status: FUNCTIONAL
Brand: STRATAFIX

## 2025-03-19 DEVICE — R3 3 HOLE ACETABULAR SHELL 60MM
Type: IMPLANTABLE DEVICE | Site: HIP | Status: FUNCTIONAL
Brand: R3 ACETABULAR

## 2025-03-19 DEVICE — OXINIUM FEMORAL HEAD 12/14 TAPER                                    28 MM +0
Type: IMPLANTABLE DEVICE | Site: HIP | Status: FUNCTIONAL
Brand: OXINIUM

## 2025-03-19 DEVICE — VIOLET ANTIBACTERIAL POLYDIOXANONE, KNOTLESS TISSUE CONTROL DEVICE
Type: IMPLANTABLE DEVICE | Site: HIP | Status: FUNCTIONAL
Brand: STRATAFIX

## 2025-03-19 DEVICE — IMPLANTABLE DEVICE: Type: IMPLANTABLE DEVICE | Site: HIP | Status: FUNCTIONAL

## 2025-03-19 DEVICE — OR3O DUAL MOBILITY LINER 46/60
Type: IMPLANTABLE DEVICE | Site: HIP | Status: FUNCTIONAL
Brand: OR3O DUAL MOBILITY

## 2025-03-19 DEVICE — REFLECTION SPHERICAL HEAD SCREW 25MM
Type: IMPLANTABLE DEVICE | Site: HIP | Status: FUNCTIONAL
Brand: REFLECTION

## 2025-03-19 DEVICE — POLARSTEM COLLAR STANDARD                                    NON-CEMENTED WITH TI/HA 4
Type: IMPLANTABLE DEVICE | Site: HIP | Status: FUNCTIONAL
Brand: POLARSTEM

## 2025-03-19 DEVICE — REFLECTION SPHERICAL HEAD SCREW 35MM
Type: IMPLANTABLE DEVICE | Site: HIP | Status: FUNCTIONAL
Brand: REFLECTION

## 2025-03-19 RX ORDER — ONDANSETRON 2 MG/ML
4 INJECTION INTRAMUSCULAR; INTRAVENOUS ONCE AS NEEDED
Status: COMPLETED | OUTPATIENT
Start: 2025-03-19 | End: 2025-03-19

## 2025-03-19 RX ORDER — MAGNESIUM HYDROXIDE 1200 MG/15ML
LIQUID ORAL AS NEEDED
Status: DISCONTINUED | OUTPATIENT
Start: 2025-03-19 | End: 2025-03-19 | Stop reason: HOSPADM

## 2025-03-19 RX ORDER — TRANEXAMIC ACID 10 MG/ML
1000 INJECTION, SOLUTION INTRAVENOUS ONCE
Status: COMPLETED | OUTPATIENT
Start: 2025-03-19 | End: 2025-03-19

## 2025-03-19 RX ORDER — PROPOFOL 10 MG/ML
INJECTION, EMULSION INTRAVENOUS CONTINUOUS PRN
Status: DISCONTINUED | OUTPATIENT
Start: 2025-03-19 | End: 2025-03-19 | Stop reason: SURG

## 2025-03-19 RX ORDER — METHOCARBAMOL 500 MG/1
750 TABLET, FILM COATED ORAL ONCE
Status: COMPLETED | OUTPATIENT
Start: 2025-03-19 | End: 2025-03-19

## 2025-03-19 RX ORDER — SODIUM CHLORIDE 0.9 % (FLUSH) 0.9 %
10 SYRINGE (ML) INJECTION AS NEEDED
Status: DISCONTINUED | OUTPATIENT
Start: 2025-03-19 | End: 2025-03-19 | Stop reason: HOSPADM

## 2025-03-19 RX ORDER — MIDAZOLAM HYDROCHLORIDE 2 MG/2ML
INJECTION, SOLUTION INTRAMUSCULAR; INTRAVENOUS AS NEEDED
Status: DISCONTINUED | OUTPATIENT
Start: 2025-03-19 | End: 2025-03-19 | Stop reason: SURG

## 2025-03-19 RX ORDER — PREGABALIN 75 MG/1
150 CAPSULE ORAL ONCE
Status: COMPLETED | OUTPATIENT
Start: 2025-03-19 | End: 2025-03-19

## 2025-03-19 RX ORDER — HYDROCODONE BITARTRATE AND ACETAMINOPHEN 5; 325 MG/1; MG/1
1 TABLET ORAL ONCE AS NEEDED
Status: COMPLETED | OUTPATIENT
Start: 2025-03-19 | End: 2025-03-19

## 2025-03-19 RX ORDER — ONDANSETRON 4 MG/1
4 TABLET, FILM COATED ORAL EVERY 8 HOURS PRN
Qty: 30 TABLET | Refills: 0 | Status: SHIPPED | OUTPATIENT
Start: 2025-03-19

## 2025-03-19 RX ORDER — ONDANSETRON 2 MG/ML
4 INJECTION INTRAMUSCULAR; INTRAVENOUS ONCE AS NEEDED
Status: DISCONTINUED | OUTPATIENT
Start: 2025-03-19 | End: 2025-03-19 | Stop reason: HOSPADM

## 2025-03-19 RX ORDER — LIDOCAINE HYDROCHLORIDE 10 MG/ML
0.5 INJECTION, SOLUTION EPIDURAL; INFILTRATION; INTRACAUDAL; PERINEURAL ONCE AS NEEDED
Status: DISCONTINUED | OUTPATIENT
Start: 2025-03-19 | End: 2025-03-19 | Stop reason: HOSPADM

## 2025-03-19 RX ORDER — FENTANYL CITRATE 50 UG/ML
50 INJECTION, SOLUTION INTRAMUSCULAR; INTRAVENOUS
Status: DISCONTINUED | OUTPATIENT
Start: 2025-03-19 | End: 2025-03-19 | Stop reason: HOSPADM

## 2025-03-19 RX ORDER — DOCUSATE SODIUM 100 MG/1
100 CAPSULE, LIQUID FILLED ORAL EVERY 12 HOURS SCHEDULED
Qty: 30 CAPSULE | Refills: 0 | Status: SHIPPED | OUTPATIENT
Start: 2025-03-19

## 2025-03-19 RX ORDER — SODIUM CHLORIDE, SODIUM LACTATE, POTASSIUM CHLORIDE, CALCIUM CHLORIDE 600; 310; 30; 20 MG/100ML; MG/100ML; MG/100ML; MG/100ML
30 INJECTION, SOLUTION INTRAVENOUS CONTINUOUS
Status: DISCONTINUED | OUTPATIENT
Start: 2025-03-19 | End: 2025-03-19 | Stop reason: HOSPADM

## 2025-03-19 RX ORDER — SODIUM CHLORIDE 9 MG/ML
40 INJECTION, SOLUTION INTRAVENOUS AS NEEDED
Status: DISCONTINUED | OUTPATIENT
Start: 2025-03-19 | End: 2025-03-19 | Stop reason: HOSPADM

## 2025-03-19 RX ORDER — CHLORHEXIDINE GLUCONATE 500 MG/1
CLOTH TOPICAL ONCE
Status: DISCONTINUED | OUTPATIENT
Start: 2025-03-19 | End: 2025-03-19 | Stop reason: HOSPADM

## 2025-03-19 RX ORDER — MIDAZOLAM HYDROCHLORIDE 2 MG/2ML
1 INJECTION, SOLUTION INTRAMUSCULAR; INTRAVENOUS
Status: DISCONTINUED | OUTPATIENT
Start: 2025-03-19 | End: 2025-03-19 | Stop reason: HOSPADM

## 2025-03-19 RX ORDER — DEXAMETHASONE SODIUM PHOSPHATE 4 MG/ML
4 INJECTION, SOLUTION INTRA-ARTICULAR; INTRALESIONAL; INTRAMUSCULAR; INTRAVENOUS; SOFT TISSUE ONCE AS NEEDED
Status: COMPLETED | OUTPATIENT
Start: 2025-03-19 | End: 2025-03-19

## 2025-03-19 RX ORDER — SODIUM CHLORIDE 0.9 % (FLUSH) 0.9 %
10 SYRINGE (ML) INJECTION EVERY 12 HOURS SCHEDULED
Status: DISCONTINUED | OUTPATIENT
Start: 2025-03-19 | End: 2025-03-19 | Stop reason: HOSPADM

## 2025-03-19 RX ORDER — ACETAMINOPHEN 500 MG
1000 TABLET ORAL ONCE
Status: COMPLETED | OUTPATIENT
Start: 2025-03-19 | End: 2025-03-19

## 2025-03-19 RX ORDER — FAMOTIDINE 10 MG/ML
20 INJECTION, SOLUTION INTRAVENOUS
Status: COMPLETED | OUTPATIENT
Start: 2025-03-19 | End: 2025-03-19

## 2025-03-19 RX ORDER — OXYCODONE AND ACETAMINOPHEN 7.5; 325 MG/1; MG/1
1 TABLET ORAL EVERY 4 HOURS PRN
Qty: 30 TABLET | Refills: 0 | Status: SHIPPED | OUTPATIENT
Start: 2025-03-19

## 2025-03-19 RX ORDER — SODIUM CHLORIDE, SODIUM LACTATE, POTASSIUM CHLORIDE, CALCIUM CHLORIDE 600; 310; 30; 20 MG/100ML; MG/100ML; MG/100ML; MG/100ML
100 INJECTION, SOLUTION INTRAVENOUS ONCE
Status: DISCONTINUED | OUTPATIENT
Start: 2025-03-19 | End: 2025-03-19 | Stop reason: HOSPADM

## 2025-03-19 RX ORDER — LIDOCAINE HYDROCHLORIDE 20 MG/ML
INJECTION, SOLUTION INFILTRATION; PERINEURAL AS NEEDED
Status: DISCONTINUED | OUTPATIENT
Start: 2025-03-19 | End: 2025-03-19 | Stop reason: SURG

## 2025-03-19 RX ORDER — FAMOTIDINE 20 MG/1
20 TABLET, FILM COATED ORAL
Status: COMPLETED | OUTPATIENT
Start: 2025-03-19 | End: 2025-03-19

## 2025-03-19 RX ORDER — ASPIRIN 81 MG/1
81 TABLET ORAL 2 TIMES DAILY
Qty: 60 TABLET | Refills: 0 | Status: SHIPPED | OUTPATIENT
Start: 2025-03-19

## 2025-03-19 RX ADMIN — ACETAMINOPHEN 1000 MG: 500 TABLET, FILM COATED ORAL at 08:04

## 2025-03-19 RX ADMIN — FENTANYL CITRATE 50 MCG: 50 INJECTION, SOLUTION INTRAMUSCULAR; INTRAVENOUS at 12:16

## 2025-03-19 RX ADMIN — MIDAZOLAM HYDROCHLORIDE 2 MG: 1 INJECTION, SOLUTION INTRAMUSCULAR; INTRAVENOUS at 09:55

## 2025-03-19 RX ADMIN — MIDAZOLAM HYDROCHLORIDE 1 MG: 1 INJECTION, SOLUTION INTRAMUSCULAR; INTRAVENOUS at 09:37

## 2025-03-19 RX ADMIN — LIDOCAINE HYDROCHLORIDE 80 MG: 20 INJECTION, SOLUTION INFILTRATION; PERINEURAL at 09:59

## 2025-03-19 RX ADMIN — HYDROMORPHONE HYDROCHLORIDE 0.5 MG: 1 INJECTION, SOLUTION INTRAMUSCULAR; INTRAVENOUS; SUBCUTANEOUS at 12:10

## 2025-03-19 RX ADMIN — PROPOFOL 100 MCG/KG/MIN: 10 INJECTION, EMULSION INTRAVENOUS at 10:02

## 2025-03-19 RX ADMIN — DEXAMETHASONE SODIUM PHOSPHATE 4 MG: 4 INJECTION, SOLUTION INTRAMUSCULAR; INTRAVENOUS at 08:03

## 2025-03-19 RX ADMIN — FAMOTIDINE 20 MG: 10 INJECTION INTRAVENOUS at 08:03

## 2025-03-19 RX ADMIN — HYDROMORPHONE HYDROCHLORIDE 0.5 MG: 1 INJECTION, SOLUTION INTRAMUSCULAR; INTRAVENOUS; SUBCUTANEOUS at 12:01

## 2025-03-19 RX ADMIN — TRANEXAMIC ACID 1000 MG: 10 INJECTION, SOLUTION INTRAVENOUS at 10:55

## 2025-03-19 RX ADMIN — PREGABALIN 150 MG: 75 CAPSULE ORAL at 08:04

## 2025-03-19 RX ADMIN — SODIUM CHLORIDE, SODIUM LACTATE, POTASSIUM CHLORIDE, AND CALCIUM CHLORIDE 30 ML/HR: 600; 310; 30; 20 INJECTION, SOLUTION INTRAVENOUS at 08:01

## 2025-03-19 RX ADMIN — CEFAZOLIN 2 G: 2 INJECTION, POWDER, FOR SOLUTION INTRAVENOUS at 10:11

## 2025-03-19 RX ADMIN — HYDROCODONE BITARTRATE AND ACETAMINOPHEN 1 TABLET: 5; 325 TABLET ORAL at 11:54

## 2025-03-19 RX ADMIN — ONDANSETRON 4 MG: 2 INJECTION INTRAMUSCULAR; INTRAVENOUS at 08:03

## 2025-03-19 RX ADMIN — VANCOMYCIN HYDROCHLORIDE 1000 MG: 1 INJECTION, POWDER, LYOPHILIZED, FOR SOLUTION INTRAVENOUS at 08:48

## 2025-03-19 RX ADMIN — METHOCARBAMOL 750 MG: 500 TABLET ORAL at 12:34

## 2025-03-19 RX ADMIN — MEPIVACAINE HYDROCHLORIDE 2 ML: 20 INJECTION, SOLUTION EPIDURAL; INFILTRATION at 09:47

## 2025-03-19 RX ADMIN — TRANEXAMIC ACID 1000 MG: 10 INJECTION, SOLUTION INTRAVENOUS at 10:12

## 2025-03-19 NOTE — ANESTHESIA POSTPROCEDURE EVALUATION
Patient: Ernst Clark    Procedure Summary       Date: 03/19/25 Room / Location:  [facility]    Anesthesia Start: 0959 Anesthesia Stop: 1116    Procedure: TOTAL HIP ARTHROPLASTY ANTERIOR WITH HANA TABLE (Right: Hip) Diagnosis:       Primary osteoarthritis of right hip      (Primary osteoarthritis of right hip [M16.11])    Surgeons: Gilberto Rowell MD Provider: Rosa Isela Weinstein CRNA    Anesthesia Type: spinal, MAC ASA Status: 2            Anesthesia Type: spinal, MAC    Vitals  Vitals Value Taken Time   /111 03/19/25 13:10   Temp 97.8 °F (36.6 °C) 03/19/25 11:45   Pulse 84 03/19/25 12:48   Resp 15 03/19/25 12:20   SpO2 100 % 03/19/25 12:48   Vitals shown include unfiled device data.        Post Anesthesia Care and Evaluation    Patient location during evaluation: PHASE II  Patient participation: complete - patient participated  Level of consciousness: awake and alert  Pain score: 4  Pain management: satisfactory to patient    Airway patency: patent  Anesthetic complications: No anesthetic complications  PONV Status: none  Cardiovascular status: acceptable, hypertensive and blood pressure returned to baseline (advised to follow up with PCP)  Respiratory status: acceptable  Hydration status: acceptable  Post Neuraxial Block status: Motor and sensory function returned to baseline and No signs or symptoms of PDPH

## 2025-03-19 NOTE — THERAPY DISCHARGE NOTE
Patient Name: Ernst Clark  : 1965    MRN: [id number]                              Today's Date: 3/19/2025       Admit Date: 3/19/2025    Visit Dx:     ICD-10-CM ICD-9-CM   1. S/P hip replacement, right  Z96.641 V43.64   2. S/P hip replacement, left  Z96.642 V43.64   3. Primary osteoarthritis of right hip  M16.11 715.15     Patient Active Problem List   Diagnosis    Primary osteoarthritis of right hip    Primary osteoarthritis of left hip     Past Medical History:   Diagnosis Date    Arthritis     Elevated cholesterol     Hip pain, bilateral     Hypertension     Left shoulder pain      Past Surgical History:   Procedure Laterality Date    KNEE CARTILAGE SURGERY Right     pt states over 20 years ago    TOTAL HIP ARTHROPLASTY Left 2025    Procedure: TOTAL HIP ARTHROPLASTY ANTERIOR WITH HANA TABLE;  Surgeon: Gilberto Rowell MD;  Location: [facility];  Service: Orthopedics;  Laterality: Left;      General Information       Row Name 25 1320          Physical Therapy Time and Intention    Document Type discharge evaluation/summary  -BP     Mode of Treatment physical therapy  -BP       Row Name 25 1320          General Information    Patient Profile Reviewed yes  Patient s/p R anterior CHERYL. WBAT. Patient had L CHERYL a couple months ago  -BP     Prior Level of Function --  Patient reports independence with mobility and ADLs at baseline without use of an assistive device  -BP     Existing Precautions/Restrictions fall;hip, anterior;right  -BP     Barriers to Rehab none identified  -BP       Row Name 25 1320          Living Environment    Current Living Arrangements home  -BP     People in Home child(benny), dependent  -BP       Row Name 25 1320          Home Main Entrance    Number of Stairs, Main Entrance --  Patient states he has a few steps to enter home.  -BP       Row Name 25 1320          Stairs Within Home, Primary    Stairs, Within Home, Primary one story with a basement.  Will stay on main level  -BP       Row Name 03/19/25 1320          Cognition    Orientation Status (Cognition) --  WFL  -BP       Row Name 03/19/25 1320          Safety Issues/Impairments Affecting Functional Mobility    Comment, Safety Issues/Impairments (Mobility) WFL  -BP               User Key  (r) = Recorded By, (t) = Taken By, (c) = Cosigned By      Initials Name Provider Type    Earl Martinez PT Physical Therapist                   Mobility       Row Name 03/19/25 1325          Bed Mobility    Comment, (Bed Mobility) deferred, patient sitting at EOB. RN present.  -BP       Row Name 03/19/25 1325          Transfers    Comment, (Transfers) Patient demonstrates proper hand placement  -BP       Row Name 03/19/25 1325          Sit-Stand Transfer    Sit-Stand [place] (Transfers) supervision  -BP     Assistive Device (Sit-Stand Transfers) walker, front-wheeled  -BP       Row Name 03/19/25 1325          Gait/Stairs (Locomotion)    [place] Level (Gait) standby assist;contact guard  -BP     Assistive Device (Gait) walker, front-wheeled  -BP     Patient was able to Ambulate yes  -BP     Distance in Feet (Gait) 200  -BP     Comment, (Gait/Stairs) Patient demonstrates good gaid speed and mechanics. No loss of balance or knee buckling noted. Swing through gait observed.  -BP               User Key  (r) = Recorded By, (t) = Taken By, (c) = Cosigned By      Initials Name Provider Type    Earl Martinez PT Physical Therapist                   Obj/Interventions       Row Name 03/19/25 1326          Range of Motion Comprehensive    Comment, General Range of Motion L LE AROM WFL. R ankle and knee AROM WFL. Rhip not assessed.  -BP       Row Name 03/19/25 1326          Strength Comprehensive (MMT)    Comment, General Manual Muscle Testing (MMT) Assessment L LE strength functional. R LE strength functional. Able to perform LAQ  -BP       Row Name 03/19/25 1326          Balance    Comment, Balance sitting  balance-independent. Standing balance-SBA with device  -BP       Row Name 03/19/25 1326          Sensory Assessment (Somatosensory)    Sensory Assessment (Somatosensory) sensation intact  -BP               User Key  (r) = Recorded By, (t) = Taken By, (c) = Cosigned By      Initials Name Provider Type    Earl Martinez, PT Physical Therapist                   Goals/Plan    No documentation.                  Clinical Impression       Row Name 03/19/25 1328          Pain    Pretreatment Pain Rating 0/10 - no pain  -BP     Posttreatment Pain Rating 0/10 - no pain  -BP       Row Name 03/19/25 1328          Plan of Care Review    Plan of Care Reviewed With patient  -BP     Outcome Evaluation PT Evaluation Complete: Patient performs sit to/from stand transfers with supervision and gait x 200 feet with SBA/CGA with use of FWW. Patient manages device safely and demonstrates good gait mechanics. No loss of balance or knee buckling noted. Patient has no concerns for return home and plans to follow up with outpatient PT. No further inpatient skilled PT needs, anticipate return home today.  -BP       Row Name 03/19/25 1328          Therapy Assessment/Plan (PT)    Criteria for Skilled Interventions Met (PT) no problems identified which require skilled intervention  -BP     Therapy Frequency (PT) evaluation only  -BP       Row Name 03/19/25 1328          Positioning and Restraints    Pre-Treatment Position in bed  -BP     Post Treatment Position bed  -BP     In Bed sitting EOB;call light within reach;encouraged to call for assist;exit alarm on  -BP               User Key  (r) = Recorded By, (t) = Taken By, (c) = Cosigned By      Initials Name Provider Type    Earl Martinez, PT Physical Therapist                   Outcome Measures       Row Name 03/19/25 1330          How much help from another person do you currently need...    Turning from your back to your side while in flat bed without using bedrails? 3  -BP      Moving from lying on back to sitting on the side of a flat bed without bedrails? 3  -BP     Moving to and from a bed to a chair (including a wheelchair)? 3  -BP     Standing up from a chair using your arms (e.g., wheelchair, bedside chair)? 3  -BP     Climbing 3-5 steps with a railing? 3  -BP     To walk in hospital room? 3  -BP     AM-PAC 6 Clicks Score (PT) 18  -BP     Highest Level of Mobility Goal 6 --> Walk 10 steps or more  -BP       Row Name 03/19/25 1339          Functional Assessment    Outcome Measure Options AM-PAC 6 Clicks Basic Mobility (PT)  -BP               User Key  (r) = Recorded By, (t) = Taken By, (c) = Cosigned By      Initials Name Provider Type    BP Earl Izquierdo, PT Physical Therapist                  Physical Therapy Education       Title: PT OT SLP Therapies (Resolved)       Topic: Physical Therapy (Resolved)       Point: Mobility training (Resolved)       Learning Progress Summary            Patient Acceptance, E,TB, VU by  at 3/19/2025 1339                      Point: Precautions (Resolved)       Learning Progress Summary            Patient Acceptance, E,TB, VU by  at 3/19/2025 1339                                      User Key       Initials Effective Dates Name Provider Type Discipline     06/16/21 -  Earl Izquierdo, PT Physical Therapist PT                  PT Recommendation and Plan     Outcome Evaluation: PT Evaluation Complete: Patient performs sit to/from stand transfers with supervision and gait x 200 feet with SBA/CGA with use of FWW. Patient manages device safely and demonstrates good gait mechanics. No loss of balance or knee buckling noted. Patient has no concerns for return home and plans to follow up with outpatient PT. No further inpatient skilled PT needs, anticipate return home today.     Time Calculation:   PT Evaluation Complexity  History, PT Evaluation Complexity: 1-2 personal factors and/or comorbidities  Examination of Body Systems (PT Eval  Complexity): 1-2 elements  Clinical Presentation (PT Evaluation Complexity): stable  Clinical Decision Making (PT Evaluation Complexity): low complexity  Overall Complexity (PT Evaluation Complexity): low complexity     PT Charges       Row Name 03/19/25 1340             Time Calculation    Start Time 1315  -BP      Stop Time 1325  -BP      Time Calculation (min) 10 min  -BP      PT Received On 03/19/25  -BP                User Key  (r) = Recorded By, (t) = Taken By, (c) = Cosigned By      Initials Name Provider Type    BP Earl Izquierdo, PT Physical Therapist                  Therapy Charges for Today       Code Description Service Date Service Provider Modifiers Qty    [id number] HC PT EVAL LOW COMPLEXITY 1 3/19/2025 Earl Izquierdo, PT GP 1            PT G-Codes  Outcome Measure Options: AM-PAC 6 Clicks Basic Mobility (PT)  AM-PAC 6 Clicks Score (PT): 18    PT Discharge Summary  Anticipated Discharge Disposition (PT): home with outpatient therapy services  Reason for Discharge: Discharge from facility  Discharge Destination: Home with outpatient services    Earl Izquierdo PT  3/19/2025

## 2025-03-19 NOTE — ANESTHESIA PREPROCEDURE EVALUATION
Anesthesia Evaluation     Patient summary reviewed and Nursing notes reviewed   no history of anesthetic complications:   NPO Solid Status: > 8 hours  NPO Liquid Status: > 8 hours           Airway   Mallampati: II  TM distance: >3 FB  Neck ROM: full  No difficulty expected  Dental - normal exam     Pulmonary     breath sounds clear to auscultation  (+) a smoker (1 cig very few days) Current, cigars,  Cardiovascular   Exercise tolerance: good (4-7 METS)    ECG reviewed  PT is on anticoagulation therapy  Rhythm: regular  Rate: normal    (+) hypertension (states he has 2 ordered but only takes one med) well controlled 2 medications or greater, hyperlipidemia      Neuro/Psych- negative ROS  GI/Hepatic/Renal/Endo - negative ROS     Musculoskeletal     (+) chronic pain  Abdominal    Substance History      OB/GYN negative ob/gyn ROS         Other   arthritis,     ROS/Med Hx Other: HEART RATE=68  bpm  RR Sigvnfjf=026  ms  CA Gczomqve=549  ms  P Horizontal Axis=6  deg  P Front Axis=-22  deg  QRSD Interval=90  ms  QT Tquihfhm=133  ms  VUyD=963  ms  QRS Axis=7  deg  T Wave Axis=52  deg  - ABNORMAL ECG -  Sinus rhythm  Consider  anteroseptal infarct  NO PRIOR TRACING AVAILABLE FOR COMPARISON  Electronically Signed By: Svetlana Murphy ([facility]) 2025-01-09 13:01:00  Date and Time of Study:2025-01-09 10:11:10                        Anesthesia Plan    ASA 2     spinal and MAC     intravenous induction     Anesthetic plan, risks, benefits, and alternatives have been provided, discussed and informed consent has been obtained with: patient.    Use of blood products discussed with patient  Consented to blood products.      CODE STATUS:

## 2025-03-19 NOTE — ANESTHESIA PROCEDURE NOTES
Spinal Block    Pre-sedation assessment completed: 3/19/2025 9:37 AM    Patient reassessed immediately prior to procedure    Patient location during procedure: pre-op  Start Time: 3/19/2025 9:43 AM  Stop Time: 3/19/2025 9:47 AM  Indication:at surgeon's request and post-op pain management  Performed By  Anesthesiologist: Lakesha Byrd MDSRNA: Suzanne Bowling SRNA  Preanesthetic Checklist  Completed: patient identified, IV checked, site marked, risks and benefits discussed, surgical consent, monitors and equipment checked, pre-op evaluation and timeout performed  Spinal Block Prep:  Patient Position:sitting  Sterile Tech:cap, gloves, mask and sterile barriers  Prep:Chloraprep  Patient Monitoring:blood pressure monitoring, continuous pulse oximetry and EKG    Spinal Block Procedure  Approach:midline  Guidance:landmark technique and palpation technique  Location:L3-L4  Needle Type:Sprotte  Needle Gauge:25 G  Placement of Spinal needle event:cerebrospinal fluid aspirated  Paresthesia: no  Fluid Appearance:clear     Post Assessment  Patient Tolerance:patient tolerated the procedure well with no apparent complications  Complications no  Additional Notes  Mepivacaine 2 mL used.     Present for spinal-L Carson GAMEZ

## 2025-03-19 NOTE — PLAN OF CARE
Goal Outcome Evaluation:  Plan of Care Reviewed With: patient           Outcome Evaluation: PT Evaluation Complete: Patient performs sit to/from stand transfers with supervision and gait x 200 feet with SBA/CGA with use of FWW. Patient manages device safely and demonstrates good gait mechanics. No loss of balance or knee buckling noted. Patient has no concerns for return home and plans to follow up with outpatient PT. No further inpatient skilled PT needs, anticipate return home today.    Anticipated Discharge Disposition (PT): home with outpatient therapy services

## 2025-03-19 NOTE — ADDENDUM NOTE
Addendum  created 03/19/25 1401 by Lakesha Byrd MD    Clinical Note Signed, Intraprocedure Blocks edited, SmartForm saved

## 2025-03-19 NOTE — CASE MANAGEMENT/SOCIAL WORK
Continued Stay Note   [facility]     Patient Name: Ernst Clark  MRN: [id number]  Today's Date: 3/19/2025    Admit Date: 3/19/2025        Discharge Plan       Row Name 03/19/25 1342       Plan    Plan Comments CM faxed ambulatory OP PT order to [facility] today at [phone]. CM will follow.                   Discharge Codes    No documentation.                       Mya Del Cid RN

## 2025-03-20 RX ORDER — SULFAMETHOXAZOLE AND TRIMETHOPRIM 800; 160 MG/1; MG/1
1 TABLET ORAL 2 TIMES DAILY
Qty: 14 TABLET | Refills: 0 | Status: SHIPPED | OUTPATIENT
Start: 2025-03-20 | End: 2025-03-27

## 2025-04-01 ENCOUNTER — OFFICE VISIT (OUTPATIENT)
Dept: ORTHOPEDIC SURGERY | Facility: CLINIC | Age: 60
End: 2025-04-01
Payer: COMMERCIAL

## 2025-04-01 VITALS — BODY MASS INDEX: 19.77 KG/M2 | WEIGHT: 159 LBS | HEIGHT: 75 IN

## 2025-04-01 DIAGNOSIS — Z96.641 STATUS POST TOTAL REPLACEMENT OF RIGHT HIP: ICD-10-CM

## 2025-04-01 DIAGNOSIS — Z96.642 S/P HIP REPLACEMENT, LEFT: ICD-10-CM

## 2025-04-01 DIAGNOSIS — Z96.642 STATUS POST TOTAL REPLACEMENT OF LEFT HIP: Primary | ICD-10-CM

## 2025-04-01 PROCEDURE — 1159F MED LIST DOCD IN RCRD: CPT | Performed by: ORTHOPAEDIC SURGERY

## 2025-04-01 PROCEDURE — 99024 POSTOP FOLLOW-UP VISIT: CPT | Performed by: ORTHOPAEDIC SURGERY

## 2025-04-01 PROCEDURE — 1160F RVW MEDS BY RX/DR IN RCRD: CPT | Performed by: ORTHOPAEDIC SURGERY

## 2025-04-01 RX ORDER — MELOXICAM 15 MG/1
15 TABLET ORAL DAILY
Qty: 30 TABLET | Refills: 2 | Status: SHIPPED | OUTPATIENT
Start: 2025-04-01

## 2025-04-01 RX ORDER — OXYCODONE AND ACETAMINOPHEN 7.5; 325 MG/1; MG/1
1 TABLET ORAL EVERY 4 HOURS PRN
Qty: 30 TABLET | Refills: 0 | Status: SHIPPED | OUTPATIENT
Start: 2025-04-01

## 2025-04-01 NOTE — PROGRESS NOTES
Subjective: Status post right total hip arthroplasty     Patient ID: Ernst Clark is a 60 y.o. male.    Chief Complaint:    History of Present Illness 60-year-old male is 2 weeks out from surgery is doing fairly well.  Having some soreness around the hip itself but more lateral than in the groin area.  Has been unable to get his pain medication prescription filled due to lack of money he states his insurance does not pay for pain medication.  But all in all is doing well following his surgery.       Social History     Occupational History    Not on file   Tobacco Use    Smoking status: Former     Types: Cigarettes     Passive exposure: Current    Smokeless tobacco: Former     Types: Snuff    Tobacco comments:     quit smoking 30 years ago     Occasional smokes and dips   Vaping Use    Vaping status: Never Used   Substance and Sexual Activity    Alcohol use: Not Currently    Drug use: Never    Sexual activity: Defer      Review of Systems   Constitutional:  Negative for chills, diaphoresis, fever and unexpected weight change.   HENT:  Negative for hearing loss, nosebleeds, sore throat and tinnitus.    Eyes:  Negative for pain and visual disturbance.   Respiratory:  Negative for cough, shortness of breath and wheezing.    Cardiovascular:  Negative for chest pain and palpitations.   Gastrointestinal:  Negative for abdominal pain, diarrhea, nausea and vomiting.   Endocrine: Negative for cold intolerance, heat intolerance and polydipsia.   Genitourinary:  Negative for difficulty urinating, dysuria and hematuria.   Musculoskeletal:  Positive for arthralgias and myalgias. Negative for joint swelling.   Skin:  Negative for rash and wound.   Allergic/Immunologic: Negative for environmental allergies.   Neurological:  Negative for dizziness, syncope and numbness.   Hematological:  Does not bruise/bleed easily.   Psychiatric/Behavioral:  Negative for dysphoric mood and sleep disturbance. The patient is not  nervous/anxious.          Past Medical History:   Diagnosis Date    Arthritis     Elevated cholesterol     Hip pain, bilateral     Hypertension     Left shoulder pain      Past Surgical History:   Procedure Laterality Date    KNEE CARTILAGE SURGERY Right     pt states over 20 years ago    TOTAL HIP ARTHROPLASTY Left 1/22/2025    Procedure: TOTAL HIP ARTHROPLASTY ANTERIOR WITH HANA TABLE;  Surgeon: Gilberto Rowell MD;  Location:  [facility];  Service: Orthopedics;  Laterality: Left;    TOTAL HIP ARTHROPLASTY Right 3/19/2025    Procedure: TOTAL HIP ARTHROPLASTY ANTERIOR WITH HANA TABLE;  Surgeon: Gilberto Rowell MD;  Location:  [facility];  Service: Orthopedics;  Laterality: Right;     Family History   Problem Relation Age of Onset    Malig Hyperthermia Neg Hx          Objective:  There were no vitals filed for this visit.      04/01/25  1057   Weight: 72.1 kg (159 lb)     Body mass index is 19.87 kg/m².  BMI is within normal parameters. No other follow-up for BMI required.        Ortho Exam   AP of the pelvis and lateral of the right hip shows perfect position of the implant.  He is alert and oriented x 3.  His wound is completely benign show no erythema no swelling.  His calf is nontender he has good distal pulses no motor or sensory deficit.    Assessment:        1. Status post total replacement of left hip    2. Status post total replacement of right hip    3. S/P hip replacement, left           Plan: He states he has money to get his pain medication filled to authorize a refill of that also I want him to take the meloxicam.  If he cannot afford the meloxicam he needs to take 2 Aleve twice a day.  Continue the aspirin for 2 more weeks then he can discontinue.  Return to see Dr. Rowell in 4 weeks.  No x-ray necessary.            Work Status:    MYA query complete.    Orders:  Orders Placed This Encounter   Procedures    XR Pelvis 1 or 2 View       Medications:  New Medications Ordered This Visit   Medications     oxyCODONE-acetaminophen (PERCOCET) 7.5-325 MG per tablet     Sig: Take 1 tablet by mouth Every 4 (Four) Hours As Needed for Severe Pain.     Dispense:  30 tablet     Refill:  0    meloxicam (MOBIC) 15 MG tablet     Sig: Take 1 tablet by mouth Daily.     Dispense:  30 tablet     Refill:  2       Followup:  Return in about 4 weeks (around 4/29/2025).          Dictated utilizing Dragon dictation

## 2025-05-02 ENCOUNTER — OFFICE VISIT (OUTPATIENT)
Dept: ORTHOPEDIC SURGERY | Facility: CLINIC | Age: 60
End: 2025-05-02
Payer: COMMERCIAL

## 2025-05-02 VITALS — HEIGHT: 75 IN | WEIGHT: 159 LBS | BODY MASS INDEX: 19.77 KG/M2

## 2025-05-02 DIAGNOSIS — Z96.641 STATUS POST TOTAL REPLACEMENT OF RIGHT HIP: Primary | ICD-10-CM

## 2025-05-02 PROCEDURE — 99024 POSTOP FOLLOW-UP VISIT: CPT | Performed by: INTERNAL MEDICINE

## 2025-05-02 NOTE — PROGRESS NOTES
Subjective:     Patient ID: Ernst Calrk is a 60 y.o. male.    Chief Complaint:    History of Present Illness  Ernst Clark returns to clinic today for evaluation of right hip status post total hip arthroplasty performed by myself 6 weeks ago.  He is no longer attending physical therapy and progressing well.  The patient denies any fevers chills or drainage from their surgical incision. He is happy with the result so far. He states that the pain and swelling are improving.  He is ambulatory today with no limp or assistive device       Social History     Occupational History    Not on file   Tobacco Use    Smoking status: Former     Types: Cigarettes     Passive exposure: Current    Smokeless tobacco: Former     Types: Snuff    Tobacco comments:     quit smoking 30 years ago     Occasional smokes and dips   Vaping Use    Vaping status: Never Used   Substance and Sexual Activity    Alcohol use: Not Currently    Drug use: Never    Sexual activity: Defer      Past Medical History:   Diagnosis Date    Arthritis     Elevated cholesterol     Hip pain, bilateral     Hypertension     Left shoulder pain      Past Surgical History:   Procedure Laterality Date    KNEE CARTILAGE SURGERY Right     pt states over 20 years ago    TOTAL HIP ARTHROPLASTY Left 1/22/2025    Procedure: TOTAL HIP ARTHROPLASTY ANTERIOR WITH HANA TABLE;  Surgeon: Gilberto Rowell MD;  Location: [facility] OR;  Service: Orthopedics;  Laterality: Left;    TOTAL HIP ARTHROPLASTY Right 3/19/2025    Procedure: TOTAL HIP ARTHROPLASTY ANTERIOR WITH HANA TABLE;  Surgeon: Gilberto Rowell MD;  Location: [facility] OR;  Service: Orthopedics;  Laterality: Right;       Family History   Problem Relation Age of Onset    Malig Hyperthermia Neg Hx                  Objective:  There were no vitals filed for this visit.  There were no vitals filed for this visit.  There is no height or weight on file to calculate BMI.  BMI is within normal parameters. No other  follow-up for BMI required.        Right Hip Exam     Tenderness   The patient is experiencing tenderness in the anterior.    Range of Motion   Flexion:  normal   External rotation:  normal   Internal rotation:  normal     Muscle Strength   Flexion: 5/5     Tests   JENNI: negative  Fadir:  Negative FADIR test    Other   Erythema: absent  Scars: present  Sensation: normal  Pulse: present               Imaging: no new    Assessment:        1. Status post total replacement of right hip           Plan:          Discussed treatment options at length with patient at today's visit. I discussed with the patient that they are doing well from my standpoint.  The patient is ambulatory today with no limp or assistive device.  He has been back to work for 4 weeks already..  I discussed with them that it is important to continue working on strengthening and range of motion exercises.  They should continue to attend physical therapy until they are discharged by the therapist or until they feel like they are no longer making improvements.  I discussed with them that it is normal to have stiffness achiness and pain particularly at night and in the morning.  This will continue to improve as time goes on and may continue to improve for 6 to 12 months postoperatively. I offered the patient a narcotic refill.  The patient's surgical incision is healed without signs of infection.  It is now okay for the patient to soak or submerge the surgical incision underwater.  They should clean the incision daily with soapy water in the shower.  I would like the patient to notify our office immediately if they note any increasing redness, pain, fevers, chills, or drainage of any kind from their surgical incision as this would be abnormal at this point in time.  I offered the patient a narcotic refill.  They may discontinue the anticoagulant from my standpoint unless prescribed by another provider prior to surgery.  I would like to see the patient  back in 6 weeks for 12-week follow-up appointment.  The patient voiced understanding and agreement with the plan.   Follow up: 6 weeks with a standing AP pelvis x-ray      Ernst Clark was in agreement with plan and had all questions answered.     Medications:  No orders of the defined types were placed in this encounter.      Followup:  No follow-ups on file.    Diagnoses and all orders for this visit:    1. Status post total replacement of right hip (Primary)          Dictated utilizing Dragon dictation

## 2025-06-13 ENCOUNTER — TELEPHONE (OUTPATIENT)
Dept: ORTHOPEDIC SURGERY | Facility: CLINIC | Age: 60
End: 2025-06-13

## 2025-06-13 ENCOUNTER — TELEPHONE (OUTPATIENT)
Dept: ORTHOPEDIC SURGERY | Facility: CLINIC | Age: 60
End: 2025-06-13
Payer: COMMERCIAL

## 2025-06-13 NOTE — TELEPHONE ENCOUNTER
Caller: SULTANA    Relationship to patient: SELF    Best call back number: [phone]    Type of visit: RESCHEDULED 6/13/25 APPT DUE TO HIM BEING OUT OF TOWN- FIRST AVAILABLE RESCHEDULE DATE 7/11/25- PLEASE CALL IF TOO FAR OUT-

## 2025-06-13 NOTE — TELEPHONE ENCOUNTER
Attempted to call patient about appt today. Dr. Rowell will be in surgery at 11:30, so I wanted to let him know that we were needing to move appt to this afternoon at 2:30.     Called pt at 8:00 am, 9:27 am, and 10:09 am.    Asked pt to call me back. If pt calls back, please send call to Leeann in the office.

## 2025-06-16 ENCOUNTER — TELEPHONE (OUTPATIENT)
Dept: ORTHOPEDIC SURGERY | Facility: CLINIC | Age: 60
End: 2025-06-16
Payer: COMMERCIAL

## 2025-07-11 ENCOUNTER — OFFICE VISIT (OUTPATIENT)
Dept: ORTHOPEDIC SURGERY | Facility: CLINIC | Age: 60
End: 2025-07-11
Payer: COMMERCIAL

## 2025-07-11 VITALS — HEIGHT: 75 IN | WEIGHT: 159 LBS | BODY MASS INDEX: 19.77 KG/M2

## 2025-07-11 DIAGNOSIS — M17.31 POST-TRAUMATIC OSTEOARTHRITIS OF RIGHT KNEE: ICD-10-CM

## 2025-07-11 DIAGNOSIS — Z96.641 STATUS POST TOTAL REPLACEMENT OF RIGHT HIP: Primary | ICD-10-CM

## 2025-07-11 DIAGNOSIS — Z96.642 STATUS POST TOTAL REPLACEMENT OF LEFT HIP: ICD-10-CM

## 2025-07-11 RX ORDER — TRIAMCINOLONE ACETONIDE 40 MG/ML
80 INJECTION, SUSPENSION INTRA-ARTICULAR; INTRAMUSCULAR
Status: COMPLETED | OUTPATIENT
Start: 2025-07-11 | End: 2025-07-11

## 2025-07-11 RX ORDER — LIDOCAINE HYDROCHLORIDE 10 MG/ML
4 INJECTION, SOLUTION EPIDURAL; INFILTRATION; INTRACAUDAL; PERINEURAL
Status: COMPLETED | OUTPATIENT
Start: 2025-07-11 | End: 2025-07-11

## 2025-07-11 RX ADMIN — TRIAMCINOLONE ACETONIDE 80 MG: 40 INJECTION, SUSPENSION INTRA-ARTICULAR; INTRAMUSCULAR at 14:57

## 2025-07-11 RX ADMIN — LIDOCAINE HYDROCHLORIDE 4 ML: 10 INJECTION, SOLUTION EPIDURAL; INFILTRATION; INTRACAUDAL; PERINEURAL at 14:57

## 2025-07-11 NOTE — PROGRESS NOTES
"Subjective:     Patient ID: Ernst Clark is a 60 y.o. male.    Chief Complaint:    History of Present Illness  Ernst Clark returns to clinic today for evaluation of bilateral hip status post total hip arthroplasty performed by myself ago.  He is no longer attending physical therapy and progressing well.  The patient denies any fevers chills or drainage from their surgical incision. He is happy with the result so far. He states that the pain and swelling are improving.  He is ambulatory today with no limp or assistive device  .  The patient states the only thing is really bothering him at this point is his right knee and his left shoulder.     Social History     Occupational History    Not on file   Tobacco Use    Smoking status: Former     Types: Cigarettes     Passive exposure: Current    Smokeless tobacco: Former     Types: Snuff    Tobacco comments:     quit smoking 30 years ago     Occasional smokes and dips   Vaping Use    Vaping status: Never Used   Substance and Sexual Activity    Alcohol use: Not Currently    Drug use: Never    Sexual activity: Defer      Past Medical History:   Diagnosis Date    Arthritis     Elevated cholesterol     Hip pain, bilateral     Hypertension     Left shoulder pain      Past Surgical History:   Procedure Laterality Date    KNEE CARTILAGE SURGERY Right     pt states over 20 years ago    TOTAL HIP ARTHROPLASTY Left 1/22/2025    Procedure: TOTAL HIP ARTHROPLASTY ANTERIOR WITH HANA TABLE;  Surgeon: Gilberto Rowell MD;  Location:  [facility];  Service: Orthopedics;  Laterality: Left;    TOTAL HIP ARTHROPLASTY Right 3/19/2025    Procedure: TOTAL HIP ARTHROPLASTY ANTERIOR WITH HANA TABLE;  Surgeon: Gilberto Rowell MD;  Location:  [facility];  Service: Orthopedics;  Laterality: Right;       Family History   Problem Relation Age of Onset    Malig Hyperthermia Neg Hx                  Objective:  Vitals:    07/11/25 1438   Weight: 72.1 kg (159 lb)   Height: 190.5 cm (75\")      "    07/11/25  1438   Weight: 72.1 kg (159 lb)     Body mass index is 19.87 kg/m².  BMI is within normal parameters. No other follow-up for BMI required.        Right Knee Exam     Tenderness   The patient is experiencing tenderness in the medial retinaculum and medial joint line.    Range of Motion   Extension:  0   Flexion:  120     Tests   Tobin:  Medial - positive Lateral - negative  Varus: negative Valgus: negative  Lachman:  Anterior - negative    Posterior - negative  Drawer:  Anterior - negative    Posterior - negative    Other   Erythema: absent  Scars: absent  Sensation: normal  Pulse: present  Swelling: mild  Effusion: no effusion present      Right Hip Exam     Tenderness   The patient is experiencing no tenderness.     Range of Motion   Flexion:  normal   External rotation:  normal   Internal rotation:  normal     Muscle Strength   Flexion: 5/5     Tests   JENNI: negative  Fadir:  Negative FADIR test    Other   Erythema: absent  Scars: present  Sensation: normal  Pulse: present      Left Hip Exam     Tenderness   The patient is experiencing no tenderness.     Range of Motion   Flexion:  normal   External rotation:  normal   Internal rotation: normal     Muscle Strength   Flexion: 5/5     Tests   JENNI: negative  Fadir:  Negative FADIR test    Other   Erythema: absent  Scars: present  Sensation: normal  Pulse: present               Imaging: Standing AP pelvis was ordered and reviewed by myself in the office today  Indication: bilateral hip replacement  Findings: X-rays demonstrate a bilateral total hip arthroplasty with implants in expected position. Leg lengths and offset appear clinically equal based off radiographic markers.  No signs of fracture, dislocation, subluxation, subsidence, or migration.  Comparative studies: last visit radiographs      Assessment:        1. Status post total replacement of right hip    2. Status post total replacement of left hip    3. Post-traumatic osteoarthritis of  right knee           Plan:  Large Joint Arthrocentesis: R knee  Date/Time: 7/11/2025 2:57 PM  Consent given by: patient  Site marked: site marked  Timeout: Immediately prior to procedure a time out was called to verify the correct patient, procedure, equipment, support staff and site/side marked as required   Supporting Documentation  Indications: pain   Procedure Details  Location: knee - R knee  Preparation: Patient was prepped and draped in the usual sterile fashion  Needle size: 22 G  Approach: anterolateral  Medications administered: 80 mg triamcinolone acetonide 40 MG/ML; 4 mL lidocaine PF 1% 1 %  Patient tolerance: patient tolerated the procedure well with no immediate complications              Discussed treatment options at length with patient at today's visit. I discussed with the patient that they are doing well from my standpoint.  I am happy with the progress that they have made.  They are ambulatory today with no assistive device and no limp or residual deficits.  I would like them to continue to work on range of motion and strengthening exercises.  I discussed with them that it is normal to have stiffness achiness and pain particularly at night and in the morning.  This will continue to improve as time goes on and may continue to improve for 6 to 12 months postoperatively.   The patient's surgical incision is healed without signs of infection.  I would like the patient to notify our office immediately if they note any increasing redness, pain, fevers, chills, or drainage of any kind from their surgical incision as this would be abnormal at this point in time.  I discussed with the patient that at this point in time we do not need to see them back for 9 months for a 1 year follow-up appointment.  I did discuss however that I am happy to see the patient sooner if issues questions or concerns arise.  The patient voiced understanding and agreement with the plan.   As for the patient's right knee he has  known right knee osteoarthritis. I discussed with the patient that the mainstays of conservative treatment for knee OA include physical therapy, nonsteroidal anti-inflammatories, injections, activity modification, and home exercises.  The patient states that they  would like to try right knee steroid injection.  At this point time the patient does not need to schedule follow-up with me today.  I am happy to see the patient back at any point time however if issues arise or they fail to make a full recovery.  Follow up: March 2026 with a standing AP pelvis x-ray      Ernst Clark was in agreement with plan and had all questions answered.     Medications:  No orders of the defined types were placed in this encounter.      Followup:  No follow-ups on file.    Diagnoses and all orders for this visit:    1. Status post total replacement of right hip (Primary)  -     XR Pelvis 1 or 2 View    2. Status post total replacement of left hip    3. Post-traumatic osteoarthritis of right knee    Other orders  -     Large Joint Arthrocentesis: R knee          Dictated utilizing Dragon dictation

## (undated) DEVICE — TRAP FLD MINIVAC MEGADYNE 100ML

## (undated) DEVICE — SOL IRR H2O BO 1000ML STRL

## (undated) DEVICE — WRP COMPR HIP SMI/COLDTHERAPY/PREM 4GELBG3HR/24 1P/U 6PK

## (undated) DEVICE — PICO 7 10CM X 20CM: Brand: PICO™ 7

## (undated) DEVICE — EXOFIN PRECISION PEN HIGH VISCOSITY TOPICAL SKIN ADHESIVE: Brand: EXOFIN PRECISION PEN, 1G

## (undated) DEVICE — INTENDED FOR TISSUE SEPARATION, AND OTHER PROCEDURES THAT REQUIRE A SHARP SURGICAL BLADE TO PUNCTURE OR CUT.: Brand: BARD-PARKER ® STAINLESS STEEL BLADES

## (undated) DEVICE — GLV SURG SENSICARE PI LF PF 7.5 GRN STRL

## (undated) DEVICE — PCH SURG INVISISHIELD FLD/COL W/DRN/PRT 20X6IN

## (undated) DEVICE — GLV SURG SENSICARE PI LF PF 7.5

## (undated) DEVICE — GLV SURG SENSICARE PI LF PF 8 GRN STRL

## (undated) DEVICE — DISPOSABLE DRAPE, STERILE, FOR A CDS-3072 6 FOOT TABLE: Brand: PEDIGO PRODUCTS, INC.

## (undated) DEVICE — NEEDLE, QUINCKE, 20GX3.5": Brand: MEDLINE

## (undated) DEVICE — LAG ANTERIOR TOTAL HIP: Brand: MEDLINE INDUSTRIES, INC.

## (undated) DEVICE — WEREWOLF FASTSEAL 6.0 HEMOSTASIS WAND: Brand: FASTSEAL 6.0 HEMOSTASIS WAND

## (undated) DEVICE — GLV SURG SENSICARE PI MIC PF SZ8 LF STRL

## (undated) DEVICE — SYS IRR SURGIPHOR A/MIC RTU BO PVPI 450ML STRL

## (undated) DEVICE — TRANSFER SET 3": Brand: MEDLINE INDUSTRIES, INC.

## (undated) DEVICE — SUT ETHIB NO 5 MB46G

## (undated) DEVICE — MAT FLR ABSORBENT LG 4FT 10 2.5FT

## (undated) DEVICE — DECANTER BAG 9": Brand: MEDLINE INDUSTRIES, INC.

## (undated) DEVICE — ADHS SKIN SURG TISS VISC PREMIERPRO EXOFIN HI/VISC 1ML

## (undated) DEVICE — DRAPE,U/ SHT,SPLIT,PLAS,STERIL: Brand: MEDLINE

## (undated) DEVICE — ELECTRD BLD EZ CLN STD 4IN